# Patient Record
Sex: FEMALE | Race: WHITE | NOT HISPANIC OR LATINO | URBAN - METROPOLITAN AREA
[De-identification: names, ages, dates, MRNs, and addresses within clinical notes are randomized per-mention and may not be internally consistent; named-entity substitution may affect disease eponyms.]

---

## 2020-01-02 ENCOUNTER — APPOINTMENT (RX ONLY)
Dept: URBAN - METROPOLITAN AREA CLINIC 31 | Facility: CLINIC | Age: 80
Setting detail: DERMATOLOGY
End: 2020-01-02

## 2020-01-02 DIAGNOSIS — L30.8 OTHER SPECIFIED DERMATITIS: ICD-10-CM

## 2020-01-02 PROCEDURE — ? ADDITIONAL NOTES

## 2020-01-02 PROCEDURE — ? COUNSELING

## 2020-01-02 PROCEDURE — 99202 OFFICE O/P NEW SF 15 MIN: CPT

## 2020-01-02 ASSESSMENT — LOCATION SIMPLE DESCRIPTION DERM: LOCATION SIMPLE: LEFT HAND

## 2020-01-02 ASSESSMENT — LOCATION DETAILED DESCRIPTION DERM: LOCATION DETAILED: LEFT DORSAL MIDDLE FINGER METACARPOPHALANGEAL JOINT

## 2020-01-02 ASSESSMENT — LOCATION ZONE DERM: LOCATION ZONE: HAND

## 2020-01-02 NOTE — PROCEDURE: ADDITIONAL NOTES
Additional Notes: Dr. Sylvester examined pts hand and FA and checked elbow lymph nodes. No nodules noted today. Advised if site worsens and develops any symptoms or grows or changes will send back to Dr. Sims for a sterile biopsy. Pt agrees and understands.
Detail Level: Detailed

## 2020-01-02 NOTE — HPI: NODULE (SMALL BUMP UNDERNEATH SKIN)
Is This A New Presentation, Or A Follow-Up?: Nodule
How Severe Is Your Nodule?: mild
Additional History: Dr Sims recommended pt to see us. She saw him in November at time biopsies were taken and showed Granulomatous inflammation. Pt denies any gardening, fish tank exposure. She is in assisted living.

## 2021-12-06 ENCOUNTER — TELEPHONE (OUTPATIENT)
Dept: MEDICAL GROUP | Facility: MEDICAL CENTER | Age: 81
End: 2021-12-06

## 2021-12-06 NOTE — TELEPHONE ENCOUNTER
Trying to call patient for Medication review for [facility].     1 Attempt(s) made to contact patient.   Last voice mail left on 12/6/21      Awaiting patient call back.     Fadi Gonzalez, Pharm. D

## 2021-12-10 ENCOUNTER — TELEPHONE (OUTPATIENT)
Dept: HEALTH INFORMATION MANAGEMENT | Facility: OTHER | Age: 81
End: 2021-12-10

## 2021-12-15 NOTE — TELEPHONE ENCOUNTER
Trying to call patient for Medication review for [facility].     3rd and last Attempt made to contact patient.   Last voice mail left on 12/17/2021    Awaiting patient call back.     Fadi Gonzalez, Pharm. D          --------------------    Trying to call patient for Medication review for [facility].     2 Attempt(s) made to contact patient.   Last voice mail left on 12/15/21 @12:07pm    Awaiting patient call back.     Yarelis Ko, Pharm. D

## 2021-12-16 LAB
HBA1C MFR BLD: 6.3 % (ref 0–5.6)
INT CON NEG: ABNORMAL
INT CON POS: ABNORMAL

## 2021-12-21 PROBLEM — E78.5 HYPERLIPIDEMIA DUE TO TYPE 2 DIABETES MELLITUS (HCC): Status: ACTIVE | Noted: 2021-12-21

## 2021-12-21 PROBLEM — S88.119A AMPUTATION BELOW KNEE (HCC): Status: ACTIVE | Noted: 2021-12-21

## 2021-12-21 PROBLEM — G54.8 PHANTOM PAIN: Status: ACTIVE | Noted: 2021-12-21

## 2021-12-21 PROBLEM — R52 PHANTOM PAIN: Status: ACTIVE | Noted: 2021-12-21

## 2021-12-21 PROBLEM — E11.69 HYPERLIPIDEMIA DUE TO TYPE 2 DIABETES MELLITUS (HCC): Status: ACTIVE | Noted: 2021-12-21

## 2021-12-21 PROBLEM — G54.6 PHANTOM PAIN: Status: ACTIVE | Noted: 2021-12-21

## 2021-12-21 PROBLEM — E11.319 DIABETIC RETINOPATHY ASSOCIATED WITH TYPE 2 DIABETES MELLITUS (HCC): Status: ACTIVE | Noted: 2021-12-21

## 2021-12-21 PROBLEM — I69.959 HEMIPLEGIA OF DOMINANT SIDE AS LATE EFFECT FOLLOWING CEREBROVASCULAR DISEASE (HCC): Status: ACTIVE | Noted: 2021-12-21

## 2021-12-30 ENCOUNTER — TELEPHONE (OUTPATIENT)
Dept: SCHEDULING | Facility: IMAGING CENTER | Age: 81
End: 2021-12-30

## 2022-01-04 ENCOUNTER — HOSPITAL ENCOUNTER (OUTPATIENT)
Facility: MEDICAL CENTER | Age: 82
End: 2022-01-04
Attending: PHYSICIAN ASSISTANT
Payer: MEDICARE

## 2022-01-04 ENCOUNTER — OFFICE VISIT (OUTPATIENT)
Dept: MEDICAL GROUP | Facility: PHYSICIAN GROUP | Age: 82
End: 2022-01-04
Payer: MEDICARE

## 2022-01-04 VITALS
HEIGHT: 67 IN | OXYGEN SATURATION: 97 % | RESPIRATION RATE: 16 BRPM | WEIGHT: 160 LBS | BODY MASS INDEX: 25.11 KG/M2 | TEMPERATURE: 97 F | SYSTOLIC BLOOD PRESSURE: 138 MMHG | DIASTOLIC BLOOD PRESSURE: 60 MMHG | HEART RATE: 63 BPM

## 2022-01-04 DIAGNOSIS — K21.9 GASTROESOPHAGEAL REFLUX DISEASE, UNSPECIFIED WHETHER ESOPHAGITIS PRESENT: ICD-10-CM

## 2022-01-04 DIAGNOSIS — E78.5 DYSLIPIDEMIA, GOAL LDL BELOW 130: Chronic | ICD-10-CM

## 2022-01-04 DIAGNOSIS — E11.69 HYPERLIPIDEMIA DUE TO TYPE 2 DIABETES MELLITUS (HCC): ICD-10-CM

## 2022-01-04 DIAGNOSIS — S88.119A AMPUTATION BELOW KNEE (HCC): ICD-10-CM

## 2022-01-04 DIAGNOSIS — R15.9 BOWEL AND BLADDER INCONTINENCE: ICD-10-CM

## 2022-01-04 DIAGNOSIS — E78.5 HYPERLIPIDEMIA DUE TO TYPE 2 DIABETES MELLITUS (HCC): ICD-10-CM

## 2022-01-04 DIAGNOSIS — G54.6 PHANTOM PAIN: ICD-10-CM

## 2022-01-04 DIAGNOSIS — E11.319 DIABETIC RETINOPATHY OF BOTH EYES ASSOCIATED WITH TYPE 2 DIABETES MELLITUS, MACULAR EDEMA PRESENCE UNSPECIFIED, UNSPECIFIED RETINOPATHY SEVERITY (HCC): ICD-10-CM

## 2022-01-04 DIAGNOSIS — I69.959 HEMIPLEGIA OF DOMINANT SIDE AS LATE EFFECT FOLLOWING CEREBROVASCULAR DISEASE (HCC): ICD-10-CM

## 2022-01-04 DIAGNOSIS — R32 BOWEL AND BLADDER INCONTINENCE: ICD-10-CM

## 2022-01-04 PROBLEM — I69.359 HEMIPLEGIA AS LATE EFFECT OF CEREBROVASCULAR ACCIDENT (CVA) (HCC): Status: ACTIVE | Noted: 2021-01-07

## 2022-01-04 LAB
ALBUMIN SERPL BCP-MCNC: 4.3 G/DL (ref 3.2–4.9)
ALBUMIN/GLOB SERPL: 1.5 G/DL
ALP SERPL-CCNC: 53 U/L (ref 30–99)
ALT SERPL-CCNC: 12 U/L (ref 2–50)
ANION GAP SERPL CALC-SCNC: 10 MMOL/L (ref 7–16)
AST SERPL-CCNC: 15 U/L (ref 12–45)
BASOPHILS # BLD AUTO: 0.7 % (ref 0–1.8)
BASOPHILS # BLD: 0.05 K/UL (ref 0–0.12)
BILIRUB SERPL-MCNC: 0.5 MG/DL (ref 0.1–1.5)
BUN SERPL-MCNC: 25 MG/DL (ref 8–22)
CALCIUM SERPL-MCNC: 9.7 MG/DL (ref 8.5–10.5)
CHLORIDE SERPL-SCNC: 105 MMOL/L (ref 96–112)
CHOLEST SERPL-MCNC: 138 MG/DL (ref 100–199)
CO2 SERPL-SCNC: 25 MMOL/L (ref 20–33)
CREAT SERPL-MCNC: 0.69 MG/DL (ref 0.5–1.4)
EOSINOPHIL # BLD AUTO: 0.16 K/UL (ref 0–0.51)
EOSINOPHIL NFR BLD: 2.1 % (ref 0–6.9)
ERYTHROCYTE [DISTWIDTH] IN BLOOD BY AUTOMATED COUNT: 46.5 FL (ref 35.9–50)
FASTING STATUS PATIENT QL REPORTED: NORMAL
GLOBULIN SER CALC-MCNC: 2.8 G/DL (ref 1.9–3.5)
GLUCOSE SERPL-MCNC: 142 MG/DL (ref 65–99)
HCT VFR BLD AUTO: 50.6 % (ref 37–47)
HDLC SERPL-MCNC: 41 MG/DL
HGB BLD-MCNC: 16.3 G/DL (ref 12–16)
IMM GRANULOCYTES # BLD AUTO: 0.03 K/UL (ref 0–0.11)
IMM GRANULOCYTES NFR BLD AUTO: 0.4 % (ref 0–0.9)
LDLC SERPL CALC-MCNC: 77 MG/DL
LYMPHOCYTES # BLD AUTO: 1.54 K/UL (ref 1–4.8)
LYMPHOCYTES NFR BLD: 20.6 % (ref 22–41)
MCH RBC QN AUTO: 29.7 PG (ref 27–33)
MCHC RBC AUTO-ENTMCNC: 32.2 G/DL (ref 33.6–35)
MCV RBC AUTO: 92.2 FL (ref 81.4–97.8)
MONOCYTES # BLD AUTO: 0.52 K/UL (ref 0–0.85)
MONOCYTES NFR BLD AUTO: 7 % (ref 0–13.4)
NEUTROPHILS # BLD AUTO: 5.16 K/UL (ref 2–7.15)
NEUTROPHILS NFR BLD: 69.2 % (ref 44–72)
NRBC # BLD AUTO: 0 K/UL
NRBC BLD-RTO: 0 /100 WBC
PLATELET # BLD AUTO: 187 K/UL (ref 164–446)
PMV BLD AUTO: 12.2 FL (ref 9–12.9)
POTASSIUM SERPL-SCNC: 4.4 MMOL/L (ref 3.6–5.5)
PROT SERPL-MCNC: 7.1 G/DL (ref 6–8.2)
RBC # BLD AUTO: 5.49 M/UL (ref 4.2–5.4)
SODIUM SERPL-SCNC: 140 MMOL/L (ref 135–145)
TRIGL SERPL-MCNC: 101 MG/DL (ref 0–149)
WBC # BLD AUTO: 7.5 K/UL (ref 4.8–10.8)

## 2022-01-04 PROCEDURE — 85025 COMPLETE CBC W/AUTO DIFF WBC: CPT

## 2022-01-04 PROCEDURE — 80061 LIPID PANEL: CPT

## 2022-01-04 PROCEDURE — 99204 OFFICE O/P NEW MOD 45 MIN: CPT | Performed by: PHYSICIAN ASSISTANT

## 2022-01-04 PROCEDURE — 82570 ASSAY OF URINE CREATININE: CPT

## 2022-01-04 PROCEDURE — 82043 UR ALBUMIN QUANTITATIVE: CPT

## 2022-01-04 PROCEDURE — 36415 COLL VENOUS BLD VENIPUNCTURE: CPT

## 2022-01-04 PROCEDURE — 80053 COMPREHEN METABOLIC PANEL: CPT

## 2022-01-04 RX ORDER — UBIDECARENONE 200 MG
1 CAPSULE ORAL DAILY
COMMUNITY

## 2022-01-04 RX ORDER — BISACODYL 5 MG/1
TABLET, DELAYED RELEASE ORAL
COMMUNITY
End: 2022-04-26

## 2022-01-04 RX ORDER — BRIMONIDINE TARTRATE 2 MG/ML
SOLUTION/ DROPS OPHTHALMIC
COMMUNITY
Start: 2021-12-15 | End: 2022-01-25

## 2022-01-04 RX ORDER — GABAPENTIN 300 MG/1
CAPSULE ORAL
COMMUNITY
Start: 2021-12-16 | End: 2022-01-04

## 2022-01-04 RX ORDER — NAPROXEN SODIUM 220 MG
TABLET ORAL
COMMUNITY
End: 2022-01-25

## 2022-01-04 RX ORDER — MECLIZINE HYDROCHLORIDE 25 MG/1
25 TABLET ORAL
COMMUNITY
Start: 2021-08-03

## 2022-01-04 RX ORDER — ATORVASTATIN CALCIUM 10 MG/1
TABLET, FILM COATED ORAL
COMMUNITY
Start: 2021-11-24 | End: 2022-02-15 | Stop reason: SDUPTHER

## 2022-01-04 RX ORDER — GABAPENTIN 600 MG/1
600 TABLET ORAL 3 TIMES DAILY
Qty: 90 TABLET | Refills: 0 | Status: SHIPPED | OUTPATIENT
Start: 2022-01-04 | End: 2022-02-03

## 2022-01-04 RX ORDER — ACETAMINOPHEN 500 MG
TABLET ORAL
COMMUNITY

## 2022-01-04 RX ORDER — SITAGLIPTIN 50 MG/1
TABLET, FILM COATED ORAL
COMMUNITY
Start: 2021-12-15 | End: 2022-01-25

## 2022-01-04 RX ORDER — OMEPRAZOLE/SODIUM BICARBONATE 20MG-1.1G
1 CAPSULE ORAL
COMMUNITY
End: 2022-01-25

## 2022-01-04 RX ORDER — BIOTIN 10 MG
1 TABLET ORAL
COMMUNITY

## 2022-01-04 RX ORDER — CYANOCOBALAMIN (VITAMIN B-12) 500 MCG
TABLET ORAL
COMMUNITY

## 2022-01-04 RX ORDER — LATANOPROST 50 UG/ML
SOLUTION/ DROPS OPHTHALMIC
COMMUNITY
End: 2022-01-25

## 2022-01-04 RX ORDER — GABAPENTIN 300 MG/1
CAPSULE ORAL
COMMUNITY
Start: 2021-12-05 | End: 2022-01-04

## 2022-01-04 RX ORDER — LOPERAMIDE HYDROCHLORIDE 2 MG/1
TABLET ORAL
COMMUNITY
End: 2022-01-25

## 2022-01-04 RX ORDER — ASPIRIN 81 MG/1
TABLET, CHEWABLE ORAL
COMMUNITY

## 2022-01-04 ASSESSMENT — PATIENT HEALTH QUESTIONNAIRE - PHQ9: CLINICAL INTERPRETATION OF PHQ2 SCORE: 0

## 2022-01-04 NOTE — PROGRESS NOTES
CC:    Chief Complaint   Patient presents with   • Establish Care       HISTORY OF THE PRESENT ILLNESS: Patient is a 81 y.o. female presenting to establish primary care. She is resident at [facility].     1. Pt taking baby ASA. Has hx of stroke on May 14, 1973. States that it has affected her muscles in her bowel and bladder. Also has R sided hemiplegia.  2. Pt on lipitor for HLD.   3. Pt on gabapentin for phantom pains for amputations below the knee for both legs. It is not helping very much. Her old Rx indicates taking 900mg qhs. Has hx of osteomyelitis that required amputation. She is still followed by Dr Kraus.   4. Pt is diabetic, currently on Januvia. Januvia is very expensive for her. Had A1C run about 2 weeks ago at 6.3%. interested in trying something less expensive.  5. 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  6. Pt sees eye Dr Haq at [facility] beds at the [facility]    No problem-specific Assessment & Plan notes found for this encounter.    Allergies: Codeine, Pcn [penicillins], Sulfa drugs, Azithromycin, Gluten meal, Lactose, and Piperacillin sod-tazobactam so    Current Outpatient Medications Ordered in Epic   Medication Sig Dispense Refill   • acetaminophen (TYLENOL) 500 MG Tab 1 tablet as needed Orally every 6 hrs     • aspirin (ASA) 81 MG Chew Tab chewable tablet 2 tab(s) orally once a day     • atorvastatin (LIPITOR) 10 MG Tab      • Biotin 10 MG Tab Take 1 Tablet by mouth every day.     • bisacodyl (DULCOLAX) 5 MG EC tablet 2 tabs as needed Orally Once a day for 30 day(s)     • Coenzyme Q10 200 MG Cap Take 1 Capsule by mouth every day.     • gabapentin (NEURONTIN) 300 MG Cap 3  cap(s) orally daily at bedtime for 90 days     • gabapentin (NEURONTIN) 300 MG Cap      • meclizine (ANTIVERT) 25 MG Tab Take 25 mg by mouth.     • Omeprazole-Sodium Bicarbonate  MG Cap Take 1 Capsule by mouth every day.     •  JANUVIA 50 MG Tab      • Cyanocobalamin (B-12) 500 MCG Tab Take  by mouth.     • Docusate Calcium (STOOL SOFTENER PO) Take  by mouth.     • Calcium Carbonate-Vitamin D (CALCIUM 600+D) 600-200 MG-UNIT TABS Take  by mouth.     • brimonidine (ALPHAGAN) 0.2 % Solution  (Patient not taking: Reported on 2022)     • latanoprost (XALATAN) 0.005 % Solution 1 gtt in each eye once a day (at bedtime) (Patient not taking: Reported on 2022)     • loperamide (IMODIUM A-D) 2 MG tablet 1 tablet Orally prn for 30 day(s) (Patient not taking: Reported on 2022)     • naproxen (ANAPROX) 220 MG tablet 1 tablet as needed Orally every 12 hrs (Patient not taking: Reported on 2022)     • multivitamin (THERAGRAN) TABS Take 1 Tab by mouth every day. (Patient not taking: Reported on 2022)     • Teriparatide, Recombinant, (FORTEO SC) Inject  as instructed. (Patient not taking: Reported on 2022)       No current [facility]-ordered facility-administered medications on file.       Past Medical History:   Diagnosis Date   • CVA (cerebral vascular accident) ([facility])    • Neuropathic peripheral nerve    • OSTEOPOROSIS    • Right knee injury     meniscus damage   • S/P BKA (below knee amputation) ([facility])        Past Surgical History:   Procedure Laterality Date   • ABDOMINAL HYSTERECTOMY TOTAL      bilateral salpingo-oophorectomy in  and    • HIP ARTHROPLASTY MIS TOTAL     • KNEE AMPUTATION BELOW Bilateral        Social History     Tobacco Use   • Smoking status: Former Smoker     Quit date: 1988     Years since quittin.0   • Smokeless tobacco: Never Used   Vaping Use   • Vaping Use: Never used   Substance Use Topics   • Alcohol use: No   • Drug use: Not on file       Social History     Social History Narrative   • Not on file       Family History   Problem Relation Age of Onset   • Heart Failure Mother    • Heart Disease Father        ROS:     - Constitutional: Negative for fever, chills, unexpected weight change, and  "fatigue/generalized weakness.     - HEENT: Negative for headaches, vision changes, hearing changes, ear pain, ear discharge, rhinorrhea, sinus congestion, sore throat, and neck pain.      - Respiratory: Negative for cough, sputum production, chest congestion, dyspnea, wheezing, and crackles.      - Cardiovascular: Negative for chest pain, palpitations, orthopnea, and bilateral lower extremity edema.     - Gastrointestinal:Positive for bowel incontinence. Negative for heartburn, nausea, vomiting, abdominal pain, hematochezia, melena, diarrhea, constipation, and greasy/foul-smelling stools.     - Genitourinary:Positive for urinary incontinence.  Negative for dysuria, polyuria, hematuria, pyuria, urinary urgency,     - Musculoskeletal: Negative for myalgias, back pain, and joint pain.     - Skin: Negative for rash, itching, cyanotic skin color change.     - Neurological:Positive for phantom pain. Negative for dizziness, tingling, tremors, focal sensory deficit, focal weakness and headaches.     - Endo/Heme/Allergies: Does not bruise/bleed easily.     - Psychiatric/Behavioral: Negative for depression, suicidal/homicidal ideation and memory loss.            .      Exam: /60   Pulse 63   Temp 36.1 °C (97 °F) (Temporal)   Resp 16   Ht 1.702 m (5' 7\")   Wt 72.6 kg (160 lb)   SpO2 97%  Body mass index is 25.06 kg/m².    General: Normal appearing. Wheelchair bound  Skin: Warm and dry.  No obvious lesions.  HEENT: Normocephalic. Eyes conjunctiva clear lids without ptosis, ears normal shape and contour  Cardiovascular: Regular rate and rhythm without murmur.   Respiratory: Clear to auscultation bilaterally, no rhonchi wheezing or rales.  Neurologic:  A&O x3, gross weakness of R side, wheelchair bound  Musculoskeletal: Positive for bilateral below knee amputations, Positive for L 3rd finger amputation.  Psych: Normal mood and affect. Judgment and insight is normal.    Please note that this dictation was created using " voice recognition software. I have made every reasonable attempt to correct obvious errors, but I expect that there are errors of grammar and possibly content that I did not discover before finalizing the note.      Assessment/Plan  1. Gastroesophageal reflux disease, unspecified whether esophagitis present  Continue omeprazole.    2. Phantom pain (HCC)  Poorly controlled. Will increase gabapentin to 600mg TID and recheck in 3 weeks.   - gabapentin (NEURONTIN) 600 MG tablet; Take 1 Tablet by mouth 3 times a day for 30 days.  Dispense: 90 Tablet; Refill: 0    3. Amputation below knee (HCC)  Wheelchair bound    4. Dyslipidemia, goal LDL below 130  Will get updated labs and review at next visit  - Lipid Profile; Future    5. 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 At next visit will discontinue januvia and trial on metformin XR to see if she can tolerate better.   - CBC WITH DIFFERENTIAL; Future  - Comp Metabolic Panel; Future  - MICROALBUMIN CREAT RATIO URINE; Future    6. Hemiplegia of dominant side as late effect following cerebrovascular disease (HCC)  Stable.     7. Hyperlipidemia due to type 2 diabetes mellitus (HCC)  Continue with lipitor.     8. Bowel and bladder incontinence  Managing well on her own with this.

## 2022-01-05 DIAGNOSIS — E11.319 DIABETIC RETINOPATHY OF BOTH EYES ASSOCIATED WITH TYPE 2 DIABETES MELLITUS, MACULAR EDEMA PRESENCE UNSPECIFIED, UNSPECIFIED RETINOPATHY SEVERITY (HCC): ICD-10-CM

## 2022-01-05 LAB
CREAT UR-MCNC: 50.48 MG/DL
MICROALBUMIN UR-MCNC: 1.3 MG/DL
MICROALBUMIN/CREAT UR: 26 MG/G (ref 0–30)

## 2022-01-25 ENCOUNTER — OFFICE VISIT (OUTPATIENT)
Dept: MEDICAL GROUP | Facility: PHYSICIAN GROUP | Age: 82
End: 2022-01-25
Payer: MEDICARE

## 2022-01-25 VITALS
TEMPERATURE: 97 F | BODY MASS INDEX: 25.06 KG/M2 | RESPIRATION RATE: 16 BRPM | HEART RATE: 67 BPM | SYSTOLIC BLOOD PRESSURE: 118 MMHG | DIASTOLIC BLOOD PRESSURE: 60 MMHG | HEIGHT: 67 IN

## 2022-01-25 DIAGNOSIS — M81.0 AGE-RELATED OSTEOPOROSIS WITHOUT CURRENT PATHOLOGICAL FRACTURE: ICD-10-CM

## 2022-01-25 DIAGNOSIS — E11.69 TYPE 2 DIABETES MELLITUS WITH OTHER SPECIFIED COMPLICATION, WITHOUT LONG-TERM CURRENT USE OF INSULIN (HCC): ICD-10-CM

## 2022-01-25 PROBLEM — Z89.511 ACQUIRED ABSENCE OF RIGHT LOWER EXTREMITY BELOW KNEE (HCC): Status: ACTIVE | Noted: 2022-01-25

## 2022-01-25 PROBLEM — Z89.512 ACQUIRED ABSENCE OF LEFT LEG BELOW KNEE (HCC): Status: ACTIVE | Noted: 2022-01-25

## 2022-01-25 PROCEDURE — 99214 OFFICE O/P EST MOD 30 MIN: CPT | Performed by: PHYSICIAN ASSISTANT

## 2022-01-25 RX ORDER — METFORMIN HYDROCHLORIDE 500 MG/1
500 TABLET, EXTENDED RELEASE ORAL 2 TIMES DAILY
Qty: 60 TABLET | Refills: 0 | Status: SHIPPED | OUTPATIENT
Start: 2022-01-25 | End: 2022-02-16

## 2022-01-25 NOTE — PROGRESS NOTES
CC:  Chief Complaint   Patient presents with   • Lab Results       HISTORY OF PRESENT ILLNESS: Patient is a 81 y.o. female established patient presenting for recheck and lab results.   1. Pt's lipids well controlled.   2. Today we are going to trial her on metformin XR and see if she tolerates it. Her last Januvia Rx cost her over $300.   3. Pt went to the [facility] to have her prolia shot for her osteoporosis but was denied because her Ca was low at 8.4. Pt notes that the order for the prolia was under her old provider's name, not mine.     No problem-specific Assessment & Plan notes found for this encounter.      Patient Active Problem List    Diagnosis Date Noted   • Gastroesophageal reflux disease 01/04/2022   • Bowel and bladder incontinence 01/04/2022   • BMI 25.0-25.9,adult 12/21/2021   • Amputation below knee ([facility]) 12/21/2021   • Phantom pain ([facility]) 12/21/2021   • Hyperlipidemia due to type 2 diabetes mellitus ([facility]) 12/21/2021   • Hemiplegia as late effect of cerebrovascular accident (CVA) ([facility]) 01/07/2021   • Diabetic retinopathy of both eyes associated with type 2 diabetes mellitus ([facility]) 01/07/2021   • Hyperlipidemia 10/25/2012   • Age-related osteoporosis without current pathological fracture 10/25/2012      Allergies:Codeine, Pcn [penicillins], Sulfa drugs, Azithromycin, Gluten meal, Lactose, and Piperacillin sod-tazobactam so    Current Outpatient Medications   Medication Sig Dispense Refill   • acetaminophen (TYLENOL) 500 MG Tab 1 tablet as needed Orally every 6 hrs     • aspirin (ASA) 81 MG Chew Tab chewable tablet 2 tab(s) orally once a day     • atorvastatin (LIPITOR) 10 MG Tab      • Biotin 10 MG Tab Take 1 Tablet by mouth every day.     • bisacodyl (DULCOLAX) 5 MG EC tablet 2 tabs as needed Orally Once a day for 30 day(s)     • Coenzyme Q10 200 MG Cap Take 1 Capsule by mouth every day.     • meclizine (ANTIVERT) 25 MG Tab Take 25 mg by mouth.     • JANUVIA 50 MG Tab      •  "Cyanocobalamin (B-12) 500 MCG Tab Take  by mouth.     • gabapentin (NEURONTIN) 600 MG tablet Take 1 Tablet by mouth 3 times a day for 30 days. 90 Tablet 0   • Docusate Calcium (STOOL SOFTENER PO) Take  by mouth.     • Calcium Carbonate-Vitamin D (CALCIUM 600+D) 600-200 MG-UNIT TABS Take  by mouth.       No current facility-administered medications for this visit.       Social History     Tobacco Use   • Smoking status: Former Smoker     Quit date: 1988     Years since quittin.0   • Smokeless tobacco: Never Used   Vaping Use   • Vaping Use: Never used   Substance Use Topics   • Alcohol use: No   • Drug use: Not on file     Social History     Social History Narrative   • Not on file       Family History   Problem Relation Age of Onset   • Heart Failure Mother    • Heart Disease Father         ROS:     - Constitutional:  Negative for fever, chills, unexpected weight change, and fatigue/generalized weakness.    - HEENT:  Negative for headaches, vision changes, hearing changes, ear pain, ear discharge, rhinorrhea, sinus congestion, sore throat, and neck pain.        - Neurological:Positive for phantom pains. Negative for dizziness, tingling, tremors, focal sensory deficit, focal weakness and headaches.       Exam:    /60   Pulse 67   Temp 36.1 °C (97 °F) (Temporal)   Resp 16   Ht 1.702 m (5' 7\")  Body mass index is 25.06 kg/m².    General:  Well nourished, well developed female in NAD  Head is grossly normal.  Neck: Supple. Thyroid is not enlarged.  Pulmonary: Clear to auscultation. No ronchi, wheezing or rales  Cardiac: Regular rate and rhythm. No murmurs.  Skin: Warm and dry. No obvious lesions  Neuro: Normal muscle tone. Wheelchair bound 2/2 amputations of bilateral LEs. Alert and oriented.  Psych: Normal mood and affect      Please note that this dictation was created using voice recognition software. I have made every reasonable attempt to correct obvious errors, but I expect that there are errors " of grammar and possibly content that I did not discover before finalizing the note.    LABS: 1/25/2022: Results reviewed and discussed with the patient, questions answered.    Assessment/Plan:  1. Age-related osteoporosis without current pathological fracture  Will check Ca levels again and find out where she can get her Prolia injection done.   - Basic Metabolic Panel; Future  - VITAMIN D,25 HYDROXY; Future    2. Type 2 diabetes mellitus with other specified complication, without long-term current use of insulin (HCC)  Stop januvia and start on metformin XR. Will see her again for this in 3 weeks to reassess.   - metFORMIN ER (GLUCOPHAGE XR) 500 MG TABLET SR 24 HR; Take 1 Tablet by mouth 2 times a day for 30 days.  Dispense: 60 Tablet; Refill: 0

## 2022-01-31 ENCOUNTER — TELEPHONE (OUTPATIENT)
Dept: MEDICAL GROUP | Facility: PHYSICIAN GROUP | Age: 82
End: 2022-01-31

## 2022-01-31 NOTE — TELEPHONE ENCOUNTER
Pt called stating that she was switched to the Metformin and started taking it Tuesday, and had severe diarrhea by Wednesday morning. She discontinued the medication and started taking her Januvia again but she would like to know if there is an alternative to the metformin that she can take or a way we can get the cost of the Januvia down.

## 2022-02-15 ENCOUNTER — APPOINTMENT (OUTPATIENT)
Dept: MEDICAL GROUP | Facility: PHYSICIAN GROUP | Age: 82
End: 2022-02-15
Payer: MEDICARE

## 2022-02-15 ENCOUNTER — TELEPHONE (OUTPATIENT)
Dept: VASCULAR LAB | Facility: MEDICAL CENTER | Age: 82
End: 2022-02-15

## 2022-02-15 ENCOUNTER — OFFICE VISIT (OUTPATIENT)
Dept: MEDICAL GROUP | Facility: PHYSICIAN GROUP | Age: 82
End: 2022-02-15
Payer: MEDICARE

## 2022-02-15 VITALS
BODY MASS INDEX: 25.11 KG/M2 | HEIGHT: 67 IN | RESPIRATION RATE: 16 BRPM | WEIGHT: 160 LBS | HEART RATE: 72 BPM | OXYGEN SATURATION: 95 % | SYSTOLIC BLOOD PRESSURE: 122 MMHG | DIASTOLIC BLOOD PRESSURE: 64 MMHG | TEMPERATURE: 97.1 F

## 2022-02-15 DIAGNOSIS — E11.69 HYPERLIPIDEMIA DUE TO TYPE 2 DIABETES MELLITUS (HCC): ICD-10-CM

## 2022-02-15 DIAGNOSIS — E11.69 TYPE 2 DIABETES MELLITUS WITH OTHER SPECIFIED COMPLICATION, WITHOUT LONG-TERM CURRENT USE OF INSULIN (HCC): ICD-10-CM

## 2022-02-15 DIAGNOSIS — G54.6 PHANTOM PAIN: ICD-10-CM

## 2022-02-15 DIAGNOSIS — Z78.0 POSTMENOPAUSAL STATUS (AGE-RELATED) (NATURAL): ICD-10-CM

## 2022-02-15 DIAGNOSIS — N95.1 MENOPAUSAL STATE: ICD-10-CM

## 2022-02-15 DIAGNOSIS — K21.9 GASTROESOPHAGEAL REFLUX DISEASE, UNSPECIFIED WHETHER ESOPHAGITIS PRESENT: ICD-10-CM

## 2022-02-15 DIAGNOSIS — E11.319 DIABETIC RETINOPATHY OF BOTH EYES ASSOCIATED WITH TYPE 2 DIABETES MELLITUS, MACULAR EDEMA PRESENCE UNSPECIFIED, UNSPECIFIED RETINOPATHY SEVERITY (HCC): ICD-10-CM

## 2022-02-15 DIAGNOSIS — E78.5 HYPERLIPIDEMIA DUE TO TYPE 2 DIABETES MELLITUS (HCC): ICD-10-CM

## 2022-02-15 DIAGNOSIS — M81.0 AGE-RELATED OSTEOPOROSIS WITHOUT CURRENT PATHOLOGICAL FRACTURE: ICD-10-CM

## 2022-02-15 DIAGNOSIS — Z12.31 ENCOUNTER FOR SCREENING MAMMOGRAM FOR BREAST CANCER: ICD-10-CM

## 2022-02-15 PROCEDURE — 99213 OFFICE O/P EST LOW 20 MIN: CPT | Performed by: NURSE PRACTITIONER

## 2022-02-15 RX ORDER — IBUPROFEN 200 MG
950 CAPSULE ORAL
COMMUNITY
Start: 2022-01-30 | End: 2022-08-04

## 2022-02-15 RX ORDER — OMEPRAZOLE 20 MG/1
20 CAPSULE, DELAYED RELEASE ORAL DAILY
COMMUNITY

## 2022-02-15 RX ORDER — ATORVASTATIN CALCIUM 10 MG/1
10 TABLET, FILM COATED ORAL DAILY
Qty: 90 TABLET | Refills: 2 | Status: SHIPPED | OUTPATIENT
Start: 2022-02-15 | End: 2022-05-23 | Stop reason: SDUPTHER

## 2022-02-15 ASSESSMENT — FIBROSIS 4 INDEX: FIB4 SCORE: 1.88

## 2022-02-15 NOTE — ASSESSMENT & PLAN NOTE
Bilateral BKA  States not caused by diabetes. States caused by osteomylitis  Currently on gabapentin for pain

## 2022-02-15 NOTE — ASSESSMENT & PLAN NOTE
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 Haq at [facility] every 3 months

## 2022-02-15 NOTE — ASSESSMENT & PLAN NOTE
Chronic and stable condition   New labs were ordered by last provider on 1/25/2022 for calcium- has not completed yet  Gets prolia injections tried to get one recently but states calcium was too low 8.4 for them to do prolia   Needs new dexa scan as she has not had follow up since being on Prolia

## 2022-02-15 NOTE — ASSESSMENT & PLAN NOTE
Chronic and stable condition   Was being prescribed Junius however due to cost she is unable to afford. Pt was recently started on metformin, which she had been on before and states it is causing same issus it caused when she was on it last time with the diarrhea  States she can only make appt for Tuesday, Thursday  Last A1C 6.3 in 12/2021

## 2022-02-15 NOTE — ASSESSMENT & PLAN NOTE
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

## 2022-02-16 PROBLEM — S68.119A FINGERTIP AMPUTATION: Status: ACTIVE | Noted: 2022-02-16

## 2022-02-16 ASSESSMENT — ENCOUNTER SYMPTOMS
HEADACHES: 0
COUGH: 0
DIZZINESS: 0
PSYCHIATRIC NEGATIVE: 1
PALPITATIONS: 0
WEAKNESS: 1
BLURRED VISION: 1
WEIGHT LOSS: 0
CHILLS: 0
FEVER: 0
MYALGIAS: 0
SHORTNESS OF BREATH: 0

## 2022-02-16 NOTE — TELEPHONE ENCOUNTER
Left vM message to schedule DM visit with the pharmacist for drug costs  Mary Cisneros, Clinical Pharmacist, CDE, CACP

## 2022-02-17 NOTE — PROGRESS NOTES
Chief Complaint   Patient presents with   • Establish Care      Subjective:     Shireen Astorga is a 82 y.o. female presenting to establish care    Type 2 diabetes mellitus with other specified complication (HCC)  Chronic and stable condition   Was being prescribed Junius however due to cost she is unable to afford. Pt was recently started on metformin, which she had been on before and states it is causing same issus it caused when she was on it last time with the diarrhea  States she can only make appt for Tuesday, Thursday  Last A1C 6.3 in 12/2021    Phantom pain (HCC)  Bilateral BKA  States not caused by diabetes. States caused by osteomylitis  Currently on gabapentin for pain    Hyperlipidemia due to type 2 diabetes mellitus (HCC)  Chronic and stable condition   Takes atorvastatin 10 mg daily   Denies myalgias    Hemiplegia as late effect of cerebrovascular accident (CVA) ([facility])  chronic and stale condition  CVA 5/14/1973  RA weakness, unable to use RA  Currently on atorvastatin, ASA 81 mg  Uses WC for mobility   Able to stand for short period of time and swivle    Gastroesophageal reflux disease  Chronic and stabble condition   Currently controlled with omeprazole    Diabetic retinopathy of both eyes associated with type 2 diabetes mellitus (HCC)  Chronic and stable condition   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  Right BKA in 2010 due to osteomyelitis patient complains of phantom pain at times  No current osteomyelitis   wears prosthetics      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 (HCC)  Chronic and stable condition  Left BKA in 2007 due to osteomyelitis  Patient complains of phantom pain at times in the left lower extremity  Denies any current issues with osteomyelitis  Wears prosthetics    Fingertip amputation  Chronic and stable condition  Left finger 3rd digit  States cream placed on wound ate through bone many years ago       Review of Systems   Constitutional: Negative for chills, fever, malaise/fatigue and weight loss.   HENT: Negative.    Eyes: Positive for blurred vision.   Respiratory: Negative for cough and shortness of breath.    Cardiovascular: Negative for chest pain, palpitations and leg swelling.   Genitourinary: Negative.    Musculoskeletal: Negative for myalgias.        Phantom leg pain   Skin: Negative.    Neurological: Positive for weakness. Negative for dizziness and headaches.   Psychiatric/Behavioral: Negative.             Current Outpatient Medications:   •  calcium citrate (CALCITRATE) 950 (200 Ca) MG Tab, Take 950 mg by mouth., Disp: , Rfl:   •  vitamin D (VITAMIND D3) 1000 UNIT Tab, Take 1,000 Units by mouth every day., Disp: , Rfl:   •  omeprazole (PRILOSEC) 20 MG delayed-release capsule, Take 20 mg by mouth every day., Disp: , Rfl:   •  BRIMONIDINE TARTRATE OP, Administer  into affected eye(s)., Disp: , Rfl:   •  atorvastatin (LIPITOR) 10 MG Tab, Take 1 Tablet by mouth every day., Disp: 90 Tablet, Rfl: 2  •  acetaminophen (TYLENOL) 500 MG Tab, 1 tablet as needed Orally every 6 hrs, Disp: , Rfl:   •  aspirin (ASA) 81 MG Chew Tab chewable tablet, 2 tab(s) orally once a day, Disp: , Rfl:   •  Biotin 10 MG Tab, Take 1 Tablet by mouth every day., Disp: , Rfl:   •  bisacodyl (DULCOLAX) 5 MG EC tablet, 2 tabs as needed Orally Once a day for 30 day(s), Disp: ,  "Rfl:   •  Coenzyme Q10 200 MG Cap, Take 1 Capsule by mouth every day., Disp: , Rfl:   •  meclizine (ANTIVERT) 25 MG Tab, Take 25 mg by mouth., Disp: , Rfl:   •  Cyanocobalamin (B-12) 500 MCG Tab, Take  by mouth., Disp: , Rfl:   •  Calcium Carbonate-Vitamin D (CALCIUM 600+D) 600-200 MG-UNIT TABS, Take  by mouth., Disp: , Rfl:     Past Medical History:   Diagnosis Date   • CVA (cerebral vascular accident) ([facility])    • Neuropathic peripheral nerve    • OSTEOPOROSIS    • Right knee injury     meniscus damage   • S/P BKA (below knee amputation) ([facility])        Past Surgical History:   Procedure Laterality Date   • ABDOMINAL HYSTERECTOMY TOTAL      bilateral salpingo-oophorectomy in 89 and 89   • HIP ARTHROPLASTY MIS TOTAL     • KNEE AMPUTATION BELOW Bilateral        Family History   Problem Relation Age of Onset   • Heart Failure Mother    • Heart Disease Father        Codeine, Pcn [penicillins], Sulfa drugs, Azithromycin, Gluten meal, Lactose, and Piperacillin sod-tazobactam so    Allergies, past medical history, past surgical history, family history, social history reviewed and updated    Objective:     Vitals: /64   Pulse 72   Temp 36.2 °C (97.1 °F) (Temporal)   Resp 16   Ht 1.702 m (5' 7\")   Wt 72.6 kg (160 lb)   LMP 10/28/1989   SpO2 95%   BMI 25.06 kg/m²   General: Alert, pleasant, NAD  EYES:   PERRL, EOMI, no icterus or pallor.  Conjunctivae and lids normal.   HENT:  Normocephalic.  External ears normal. Tympanic membranes pearly, opaque.  No nasal drainage present.  Oropharynx non-erythematous, mucous membranes moist.  Neck supple.   No thyromegaly or masses palpated. No cervical or supraclavicular lymphadenopathy.  Heart: Regular rate and rhythm.  S1 and S2 normal.  No murmurs appreciated.  Respiratory: Normal respiratory effort.  Clear to auscultation bilaterally.  Abdomen: Non-distended, soft, non-tender, no guarding/rebound. Bowel sounds present.  No hepatosplenomegaly.  No hernias.  Skin: Warm, dry, " no rashes.  Musculoskeletal: uses WC for mobility,  Right sided hemplegia, contractured right hand with minimal ROM.  Moves other all extremities well.    Extremities: No clubbing, cyanosis or edema noted. Missing 3rd digit on left hand   Neurological: No tremors, sensation grossly intact, tone/strength normal 2/2 on left side, 1/2 on right side UE, CN2-12 intact.  Psych:  Affect/mood is normal, judgement is good, memory is intact, grooming is appropriate.    Assessment/Plan:     1. Hyperlipidemia due to type 2 diabetes mellitus (HCC)  - Referral to Pharmacotherapy Service  - atorvastatin (LIPITOR) 10 MG Tab; Take 1 Tablet by mouth every day.  Dispense: 90 Tablet; Refill: 2  2. Type 2 diabetes mellitus with other specified complication, without long-term current use of insulin (HCC)  Patient would benefit from follow up with diabetic pharmacist. Patient can only be seen in Tuesday ad Thursdays    3. Phantom pain (HCC)  Continue with gabapentin    4. Gastroesophageal reflux disease, unspecified whether esophagitis present  Continue with prilosec    5. Diabetic retinopathy of both eyes associated with type 2 diabetes mellitus, macular edema presence unspecified, unspecified retinopathy severity ([facility])  [facility]    6. Encounter for screening mammogram for breast cancer  - MA-SCREENING MAMMO BILAT W/TOMOSYNTHESIS W/CAD; Future  Wants referral for mammogram- discussed she is no longer needing to screen but would like to continue at this time    7. Age-related osteoporosis without current pathological fracture  - Basic Metabolic Panel; Future  Trend Ca and then follow up regarding prolia injections  Needs Dexa scan   8. Postmenopausal status (age-related) (natural)  - DS-BONE DENSITY STUDY (DEXA); Future  9. Menopausal state  - DS-BONE DENSITY STUDY (DEXA); Future       Discussed with patient possible alternative diagnoses, patient is to take all medications as prescribed.      If symptoms persist FU w/PCP, if symptoms worsen go to emergency room.     If experiencing any side effects from prescribed medications reports to the office immediately or go to emergency room.    Reviewed indication, dosage, usage and potential adverse effects of prescribed medications.     Reviewed risks and benefits of treatment plan. Patient verbalizes understanding of all instruction and verbally agrees to plan.    Discussed plan with the patient, and she agrees to the above.      I personally reviewed prior external notes and test results pertinent to today's visit.        Return in about 2 weeks (around 3/1/2022) for lab review.    My total time spent caring for the patient on the day of the encounter was 25 minutes.   This does not include time spent on separately billable procedures/tests.     Please note that this dictation was created using voice recognition software. I have made every reasonable attempt to correct obvious errors, but I expect that there may be errors of grammar and possibly content that I did not discover before finalizing the note.

## 2022-02-17 NOTE — ASSESSMENT & PLAN NOTE
Chronic and stable condition.  Right BKA in 2010 due to osteomyelitis patient complains of phantom pain at times  No current osteomyelitis   wears prosthetics

## 2022-02-17 NOTE — ASSESSMENT & PLAN NOTE
Chronic and stable condition  Left BKA in 2007 due to osteomyelitis  Patient complains of phantom pain at times in the left lower extremity  Denies any current issues with osteomyelitis  Wears prosthetics

## 2022-02-17 NOTE — ASSESSMENT & PLAN NOTE
Chronic and stable condition  Left finger 3rd digit  States cream placed on wound ate through bone many years ago

## 2022-02-24 ENCOUNTER — TELEPHONE (OUTPATIENT)
Dept: MEDICAL GROUP | Facility: PHYSICIAN GROUP | Age: 82
End: 2022-02-24
Payer: MEDICARE

## 2022-03-08 ENCOUNTER — TELEPHONE (OUTPATIENT)
Dept: MEDICAL GROUP | Facility: PHYSICIAN GROUP | Age: 82
End: 2022-03-08
Payer: MEDICARE

## 2022-03-08 NOTE — TELEPHONE ENCOUNTER
Pt called asking for Nystatin cream for rash, states somebody else prescribed it one year ago and will like a refill. I explained that she needs an appt for medication because current PCP has not prescribed it before, pt upset and wants to talk to provider. In addition pt states she has been calling provider for different reasons and hasn't gotten a call back

## 2022-03-10 ENCOUNTER — NON-PROVIDER VISIT (OUTPATIENT)
Dept: MEDICAL GROUP | Facility: PHYSICIAN GROUP | Age: 82
End: 2022-03-10
Payer: MEDICARE

## 2022-03-10 ENCOUNTER — ANTICOAGULATION MONITORING (OUTPATIENT)
Dept: MEDICAL GROUP | Facility: PHYSICIAN GROUP | Age: 82
End: 2022-03-10

## 2022-03-10 DIAGNOSIS — E11.65 TYPE 2 DIABETES MELLITUS WITH HYPERGLYCEMIA, WITHOUT LONG-TERM CURRENT USE OF INSULIN (HCC): ICD-10-CM

## 2022-03-10 PROCEDURE — 83036 HEMOGLOBIN GLYCOSYLATED A1C: CPT | Performed by: STUDENT IN AN ORGANIZED HEALTH CARE EDUCATION/TRAINING PROGRAM

## 2022-03-10 PROCEDURE — 99211 OFF/OP EST MAY X REQ PHY/QHP: CPT | Performed by: STUDENT IN AN ORGANIZED HEALTH CARE EDUCATION/TRAINING PROGRAM

## 2022-03-10 NOTE — NON-PROVIDER
Patient Consult Note    TIME IN: 10:00  TIME OUT: 10:33    Primary care physician: CARMELO Herbert    Reason for consult: Management of Uncontrolled Type 2 Diabetes    HPI:  Shireen Astorga is a 82 y.o. old patient who comes in today for evaluation of above stated problem.    Most Recent HbA1c:   Lab Results   Component Value Date/Time    HBA1C 6.3 (A) 12/16/2021 12:00 AM      Lab Results   Component Value Date/Time    CREATININE 0.69 01/04/2022 11:12 AM              Diabetes Medication History and Current Regimen  Metformin: not tolerated secondary to GI SE     Januvia      Pt has home glucometer and proper testing technique - yes    Pt reports blood sugars:       Hypoglycemia awareness - yes  Nocturnal hypoglycemia- none  Hypoglycemia:  None    Pt's treatment of Hypoglycemia - n/a  - 15:15 Rule    Current Exercise - none pt is wheelchair bound  Exercise Goal - pt would benefit from 30 minutes of daily dedicated walking, however is unable to due to her wheelchair status    Dietary - common adult        Foot Exam:  Monofilament exam - current  Monofilament testing with a 10 gram force: sensation intact: decreased bilaterally.    Visual Inspection: Feet without maceration, ulcers, fissures.  Feet dry.  Pedal pulses: intact bilaterally    Preventative Management  BP regimen (ACE/ARB) - none  ASA - 81m gpo daily  Statin - atorva 10mg po daiyl  Last Retinal Scan - over due  Last Foot Exam - current  Last A1c -   Lab Results   Component Value Date/Time    HBA1C 6.3 (A) 12/16/2021 12:00 AM      Last Microalbuminuria - current     updated caregaps    Past Medical History:  Patient Active Problem List    Diagnosis Date Noted   • Fingertip amputation 02/16/2022   • Type 2 diabetes mellitus with other specified complication (HCC) 01/25/2022   • 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) 01/25/2022   • 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 (HCC) 01/25/2022   • Gastroesophageal reflux disease  2022   • Bowel and bladder incontinence 2022   • BMI 25.0-25.9,adult 2021   • Phantom pain ([facility]) 2021   • Hyperlipidemia due to type 2 diabetes mellitus ([facility]) 2021   • Hemiplegia as late effect of cerebrovascular accident (CVA) ([facility]) 2021   • Diabetic retinopathy of both eyes associated with type 2 diabetes mellitus ([facility]) 2021   • Age-related osteoporosis without current pathological fracture 10/25/2012       Past Surgical History:  Past Surgical History:   Procedure Laterality Date   • ABDOMINAL HYSTERECTOMY TOTAL      bilateral salpingo-oophorectomy in  and 89   • HIP ARTHROPLASTY MIS TOTAL     • KNEE AMPUTATION BELOW Bilateral        Allergies:  Codeine, Pcn [penicillins], Sulfa drugs, Azithromycin, Gluten meal, Lactose, and Piperacillin sod-tazobactam so    Social History:  Social History     Socioeconomic History   • Marital status: Unknown     Spouse name: Not on file   • Number of children: Not on file   • Years of education: Not on file   • Highest education level: Not on file   Occupational History   • Not on file   Tobacco Use   • Smoking status: Former Smoker     Quit date: 1988     Years since quittin.2   • Smokeless tobacco: Never Used   Vaping Use   • Vaping Use: Never used   Substance and Sexual Activity   • Alcohol use: No   • Drug use: Not on file   • Sexual activity: Not on file   Other Topics Concern   • Not on file   Social History Narrative   • Not on file     Social Determinants of Health     Financial Resource Strain: Not on file   Food Insecurity: Not on file   Transportation Needs: Not on file   Physical Activity: Not on file   Stress: Not on file   Social Connections: Not on file   Intimate Partner Violence: Not on file   Housing Stability: Not on file       Family History:  Family History   Problem Relation Age of Onset   • Heart Failure Mother    • Heart Disease Father        Medications:    Current Outpatient Medications:   •  calcium  citrate (CALCITRATE) 950 (200 Ca) MG Tab, Take 950 mg by mouth., Disp: , Rfl:   •  vitamin D (VITAMIND D3) 1000 UNIT Tab, Take 1,000 Units by mouth every day., Disp: , Rfl:   •  omeprazole (PRILOSEC) 20 MG delayed-release capsule, Take 20 mg by mouth every day., Disp: , Rfl:   •  BRIMONIDINE TARTRATE OP, Administer  into affected eye(s)., Disp: , Rfl:   •  atorvastatin (LIPITOR) 10 MG Tab, Take 1 Tablet by mouth every day., Disp: 90 Tablet, Rfl: 2  •  aspirin (ASA) 81 MG Chew Tab chewable tablet, 2 tab(s) orally once a day, Disp: , Rfl:   •  Biotin 10 MG Tab, Take 1 Tablet by mouth every day., Disp: , Rfl:   •  bisacodyl (DULCOLAX) 5 MG EC tablet, 2 tabs as needed Orally Once a day for 30 day(s), Disp: , Rfl:   •  Coenzyme Q10 200 MG Cap, Take 1 Capsule by mouth every day., Disp: , Rfl:   •  meclizine (ANTIVERT) 25 MG Tab, Take 25 mg by mouth., Disp: , Rfl:   •  Cyanocobalamin (B-12) 500 MCG Tab, Take  by mouth., Disp: , Rfl:   •  Calcium Carbonate-Vitamin D 600-200 MG-UNIT Tab, Take  by mouth., Disp: , Rfl:   •  acetaminophen (TYLENOL) 500 MG Tab, 1 tablet as needed Orally every 6 hrs (Patient not taking: Reported on 3/10/2022), Disp: , Rfl:     Labs: Reviewed    Physical Examination:  Vital signs: LMP 10/28/1989  There is no height or weight on file to calculate BMI.    Assessment and Plan:    1. DM2  • Last A1c 6.3 from [facility]  • Pt is currently unable to afford her Januvia  • Will begin Jardiance 25mg po daily, PAP completed and faxed  • Will see back in 4 weeks to see how new medication is tolerated and to see if PAP approved.    - Medication changes:  • Dc januvia  • Start Jardiance 25mg po daily - samples given  •      - Lifestyle changes:  • Continue to reduce simple carbohydrate ingestion    Return in about 4 weeks (around 4/7/2022).    Mary Cisneros  03/10/22    CC:   CARMELO Herbert

## 2022-03-15 ENCOUNTER — DOCUMENTATION (OUTPATIENT)
Dept: VASCULAR LAB | Facility: MEDICAL CENTER | Age: 82
End: 2022-03-15
Payer: MEDICARE

## 2022-03-15 NOTE — PROGRESS NOTES
[facility]      Received correspondence from [facility] in regards to patient's application for assistance with cost of Jardiance.    is requesting missing information to be faxed he application submitted was incomplete and is missing the followin. Proof of income  2. Patient signature and Date on bottom of  Page 4    Called the patient and LVM to inform her about the missing information needed and requested that she gather the income verification and either bring in to her upcoming appt on 22 or drop by the clinic to turn in and sign page 4 so we can refax to .   Fax will be scanned into media for future reference.     Jennifer Harrison  PharmD      CC: Mary Cisneros

## 2022-03-24 ENCOUNTER — OFFICE VISIT (OUTPATIENT)
Dept: MEDICAL GROUP | Facility: PHYSICIAN GROUP | Age: 82
End: 2022-03-24
Payer: MEDICARE

## 2022-03-24 VITALS
SYSTOLIC BLOOD PRESSURE: 102 MMHG | HEART RATE: 62 BPM | OXYGEN SATURATION: 97 % | BODY MASS INDEX: 25.11 KG/M2 | WEIGHT: 160 LBS | RESPIRATION RATE: 20 BRPM | TEMPERATURE: 97.6 F | HEIGHT: 67 IN | DIASTOLIC BLOOD PRESSURE: 50 MMHG

## 2022-03-24 DIAGNOSIS — E11.69 HYPERLIPIDEMIA DUE TO TYPE 2 DIABETES MELLITUS (HCC): ICD-10-CM

## 2022-03-24 DIAGNOSIS — E78.5 HYPERLIPIDEMIA DUE TO TYPE 2 DIABETES MELLITUS (HCC): ICD-10-CM

## 2022-03-24 DIAGNOSIS — I95.9 HYPOTENSION, UNSPECIFIED HYPOTENSION TYPE: ICD-10-CM

## 2022-03-24 DIAGNOSIS — M81.0 AGE-RELATED OSTEOPOROSIS WITHOUT CURRENT PATHOLOGICAL FRACTURE: ICD-10-CM

## 2022-03-24 PROCEDURE — 99213 OFFICE O/P EST LOW 20 MIN: CPT | Performed by: NURSE PRACTITIONER

## 2022-03-24 ASSESSMENT — ENCOUNTER SYMPTOMS
FEVER: 0
WEIGHT LOSS: 0
CHILLS: 0
WEAKNESS: 0
HEADACHES: 0
SHORTNESS OF BREATH: 0
DIZZINESS: 0

## 2022-03-24 ASSESSMENT — FIBROSIS 4 INDEX: FIB4 SCORE: 1.88

## 2022-03-24 NOTE — ASSESSMENT & PLAN NOTE
Needs more Jardiance  Has follow up with Mary Filter for DM management  Is pending Patient assistance for cost of jaridance

## 2022-03-24 NOTE — ASSESSMENT & PLAN NOTE
Dexa Scan completed showing continued decrease in bone density   Gets prolia injections  Needs new infusion appt to be set up for prolia injections at [facility] in [facility]  Calcium was retested at 8.6

## 2022-03-24 NOTE — ASSESSMENT & PLAN NOTE
Office /50  Previous visits patient has had stable BP  Denies change in vision, lightheadedness, HA, worsening weakness

## 2022-03-24 NOTE — PROGRESS NOTES
CC:  Chief Complaint   Patient presents with   • Results     DEXA, MAMMO       HISTORY OF PRESENT ILLNESS: Patient is a 82 y.o. female established patient presenting for follow up on mammogram and Dexa scan      Age-related osteoporosis without current pathological fracture  Dexa Scan completed showing continued decrease in bone density   Gets prolia injections  Needs new infusion appt to be set up for prolia injections at cancer center in [facility]  Calcium was retested at 8.6        Low BP  Office /50  Previous visits patient has had stable BP  Denies change in vision, lightheadedness, HA, worsening weakness     Hyperlipidemia due to type 2 diabetes mellitus (HCC)  Needs more Jardiance  Has follow up with Mary Filter for DM management  Is pending Patient assistance for cost of jaridance      Patient Active Problem List    Diagnosis Date Noted   • Low BP 03/24/2022   • Fingertip amputation 02/16/2022   • Type 2 diabetes mellitus with other specified complication ([facility]) 01/25/2022   • 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 ([facility]) 01/25/2022   • 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 ([facility]) 01/25/2022   • Gastroesophageal reflux disease 01/04/2022   • Bowel and bladder incontinence 01/04/2022   • BMI 25.0-25.9,adult 12/21/2021   • Phantom pain ([facility]) 12/21/2021   • Hyperlipidemia due to type 2 diabetes mellitus ([facility]) 12/21/2021   • Hemiplegia as late effect of cerebrovascular accident (CVA) ([facility]) 01/07/2021   • Diabetic retinopathy of both eyes associated with type 2 diabetes mellitus ([facility]) 01/07/2021   • Age-related osteoporosis without current pathological fracture 10/25/2012      Allergies:Codeine, Lactose, Pcn [penicillins], Sulfa drugs, Azithromycin, Gluten meal, and Piperacillin sod-tazobactam so    Current Outpatient Medications   Medication Sig Dispense Refill   • calcium citrate (CALCITRATE) 950 (200 Ca) MG Tab Take 950 mg by mouth.     • vitamin D (VITAMIND D3) 1000 UNIT Tab Take 1,000  "Units by mouth every day.     • omeprazole (PRILOSEC) 20 MG delayed-release capsule Take 20 mg by mouth every day.     • BRIMONIDINE TARTRATE OP Administer  into affected eye(s).     • atorvastatin (LIPITOR) 10 MG Tab Take 1 Tablet by mouth every day. 90 Tablet 2   • acetaminophen (TYLENOL) 500 MG Tab      • aspirin (ASA) 81 MG Chew Tab chewable tablet 2 tab(s) orally once a day     • Biotin 10 MG Tab Take 1 Tablet by mouth every day.     • bisacodyl (DULCOLAX) 5 MG EC tablet 2 tabs as needed Orally Once a day for 30 day(s)     • Coenzyme Q10 200 MG Cap Take 1 Capsule by mouth every day.     • meclizine (ANTIVERT) 25 MG Tab Take 25 mg by mouth.     • Cyanocobalamin (B-12) 500 MCG Tab Take  by mouth.     • Calcium Carbonate-Vitamin D 600-200 MG-UNIT Tab Take  by mouth.       No current facility-administered medications for this visit.       Social History     Tobacco Use   • Smoking status: Former Smoker     Quit date: 1988     Years since quittin.2   • Smokeless tobacco: Never Used   Vaping Use   • Vaping Use: Never used   Substance Use Topics   • Alcohol use: No     Social History     Social History Narrative   • Not on file       Family History   Problem Relation Age of Onset   • Heart Failure Mother    • Heart Disease Father         Review of Systems   Constitutional: Negative for chills, fever, malaise/fatigue and weight loss.   Respiratory: Negative for shortness of breath.    Cardiovascular: Negative for chest pain.   Neurological: Negative for dizziness, weakness and headaches.   Psychiatric/Behavioral: Negative for suicidal ideas.             - NOTE: All other systems reviewed and are negative, except as in HPI.      Exam:    /50   Pulse 62   Temp 36.4 °C (97.6 °F) (Temporal)   Resp 20   Ht 1.702 m (5' 7\")   Wt 72.6 kg (160 lb)   SpO2 97%  Body mass index is 25.06 kg/m².    General: Alert, pleasant, NAD  EYES:   PERRL, EOMI, no icterus or pallor.  Conjunctivae and lids normal.   HENT:  " Normocephalic.  External ears normal.   Respiratory: Normal respiratory effort.   Skin: Warm, dry, no rashes.  Musculoskeletal: WC bound, bilateral BKA, right arm paralysis from past CVA  Extremities: No clubbing, cyanosis or edema noted.   Neurological: No tremors, right arm paralysis from past CVA  Psych:  Affect/mood is normal, judgement is good, memory is intact, grooming is appropriate.      LABS: Dexa Scan, Ca and Mammogram: Results reviewed and discussed with the patient, questions answered.    Assessment/Plan:  1. Age-related osteoporosis without current pathological fracture  Would like to get back into prolia injections    2. Hypotension, unspecified hypotension type  Continue to monitor  Provided pt with BP journal to use for next week and return to office    3. Hyperlipidemia due to type 2 diabetes mellitus (HCC)  Follow with Mary Filter 4/7/2022    Discussed with patient possible alternative diagnoses, patient is to take all medications as prescribed.     If symptoms persist FU w/PCP, if symptoms worsen go to emergency room.     If experiencing any side effects from prescribed medications reports to the office immediately or go to emergency room.    Reviewed indication, dosage, usage and potential adverse effects of prescribed medications.     Reviewed risks and benefits of treatment plan. Patient verbalizes understanding of all instruction and verbally agrees to plan.      Return in about 1 month (around 4/26/2022) for AWV.    My total time spent caring for the patient on the day of the encounter was 22 minutes.   This does not include time spent on separately billable procedures/tests.     Please note that this dictation was created using voice recognition software. I have made every reasonable attempt to correct obvious errors, but I expect that there are errors of grammar and possibly content that I did not discover before finalizing the note.

## 2022-04-07 ENCOUNTER — ANTICOAGULATION MONITORING (OUTPATIENT)
Dept: MEDICAL GROUP | Facility: PHYSICIAN GROUP | Age: 82
End: 2022-04-07

## 2022-04-07 ENCOUNTER — TELEPHONE (OUTPATIENT)
Dept: VASCULAR LAB | Facility: MEDICAL CENTER | Age: 82
End: 2022-04-07
Payer: MEDICARE

## 2022-04-07 ENCOUNTER — NON-PROVIDER VISIT (OUTPATIENT)
Dept: MEDICAL GROUP | Facility: PHYSICIAN GROUP | Age: 82
End: 2022-04-07
Payer: MEDICARE

## 2022-04-07 DIAGNOSIS — E11.65 TYPE 2 DIABETES MELLITUS WITH HYPERGLYCEMIA, WITHOUT LONG-TERM CURRENT USE OF INSULIN (HCC): ICD-10-CM

## 2022-04-07 LAB — GLUCOSE BLD-MCNC: 168 MG/DL (ref 70–100)

## 2022-04-07 PROCEDURE — 82962 GLUCOSE BLOOD TEST: CPT | Performed by: STUDENT IN AN ORGANIZED HEALTH CARE EDUCATION/TRAINING PROGRAM

## 2022-04-07 RX ORDER — EMPAGLIFLOZIN 25 MG/1
1 TABLET, FILM COATED ORAL DAILY
COMMUNITY

## 2022-04-07 NOTE — PATIENT INSTRUCTIONS
Pt should qualify for low income subsidy medicaid (for prescriptions) please apply by calling [phone] or apply online

## 2022-04-07 NOTE — NON-PROVIDER
Patient Consult Note    TIME IN: 9:55  TIME OUT: 10:22    Primary care physician: CARMELO Herbert    Reason for consult: Management of Uncontrolled Type 2 Diabetes    HPI:  Shireen Astorga is a 82 y.o. old patient who comes in today for evaluation of above stated problem.    Most Recent HbA1c and POCT glucose:   Lab Results   Component Value Date/Time    HBA1C 6.3 (A) 12/16/2021 12:00 AM            Most Recent Scr:  Lab Results   Component Value Date/Time    CREATININE 0.69 01/04/2022 11:12 AM        Diabetes Medication History and Current Regimen  Metformin: IR/ER not tolerated secondary to GI SE   GLP-1 Agent: none   SGLT-2 Inhibitor: Jardiance 25mg po daily           Hypoglycemia awareness - yes  Nocturnal hypoglycemia- none  Hypoglycemia:  None    Pt's treatment of Hypoglycemia - n/a  - 15:15 Rule    Current Exercise - none - pt s/p stroke with l sided michelle  Exercise Goal - 30 minutes daily    Dietary - common adult diet    Foot Exam:  Monofilament exam - current  Monofilament testing with a 10 gram force: sensation intact: decreased bilaterally.    Visual Inspection: Feet without maceration, ulcers, fissures.  Feet dry.  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) 01/25/2022   • 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 (HCC) 01/25/2022   • Gastroesophageal reflux disease 01/04/2022   • Bowel and bladder incontinence 01/04/2022   • BMI  25.0-25.9,adult 2021   • Phantom pain ([facility]) 2021   • Hyperlipidemia due to type 2 diabetes mellitus ([facility]) 2021   • Hemiplegia as late effect of cerebrovascular accident (CVA) ([facility]) 2021   • Diabetic retinopathy of both eyes associated with type 2 diabetes mellitus ([facility]) 2021   • Age-related osteoporosis without current pathological fracture 10/25/2012       Past Surgical History:  Past Surgical History:   Procedure Laterality Date   • ABDOMINAL HYSTERECTOMY TOTAL      bilateral salpingo-oophorectomy in  and    • HIP ARTHROPLASTY MIS TOTAL     • KNEE AMPUTATION BELOW Bilateral        Allergies:  Codeine, Lactose, Pcn [penicillins], Sulfa drugs, Azithromycin, Gluten meal, and Piperacillin sod-tazobactam so    Social History:  Social History     Socioeconomic History   • Marital status: Unknown     Spouse name: Not on file   • Number of children: Not on file   • Years of education: Not on file   • Highest education level: Not on file   Occupational History   • Not on file   Tobacco Use   • Smoking status: Former Smoker     Quit date: 1988     Years since quittin.2   • Smokeless tobacco: Never Used   Vaping Use   • Vaping Use: Never used   Substance and Sexual Activity   • Alcohol use: No   • Drug use: Not on file   • Sexual activity: Not on file   Other Topics Concern   • Not on file   Social History Narrative   • Not on file     Social Determinants of Health     Financial Resource Strain: Not on file   Food Insecurity: Not on file   Transportation Needs: Not on file   Physical Activity: Not on file   Stress: Not on file   Social Connections: Not on file   Intimate Partner Violence: Not on file   Housing Stability: Not on file       Family History:  Family History   Problem Relation Age of Onset   • Heart Failure Mother    • Heart Disease Father        Medications:    Current Outpatient Medications:   •  calcium citrate (CALCITRATE) 950 (200 Ca) MG Tab, Take 950 mg by mouth.,  Disp: , Rfl:   •  vitamin D (VITAMIND D3) 1000 UNIT Tab, Take 1,000 Units by mouth every day., Disp: , Rfl:   •  omeprazole (PRILOSEC) 20 MG delayed-release capsule, Take 20 mg by mouth every day., Disp: , Rfl:   •  BRIMONIDINE TARTRATE OP, Administer  into affected eye(s)., Disp: , Rfl:   •  atorvastatin (LIPITOR) 10 MG Tab, Take 1 Tablet by mouth every day., Disp: 90 Tablet, Rfl: 2  •  acetaminophen (TYLENOL) 500 MG Tab, , Disp: , Rfl:   •  aspirin (ASA) 81 MG Chew Tab chewable tablet, 2 tab(s) orally once a day, Disp: , Rfl:   •  Biotin 10 MG Tab, Take 1 Tablet by mouth every day., Disp: , Rfl:   •  bisacodyl (DULCOLAX) 5 MG EC tablet, 2 tabs as needed Orally Once a day for 30 day(s), Disp: , Rfl:   •  Coenzyme Q10 200 MG Cap, Take 1 Capsule by mouth every day., Disp: , Rfl:   •  meclizine (ANTIVERT) 25 MG Tab, Take 25 mg by mouth., Disp: , Rfl:   •  Cyanocobalamin (B-12) 500 MCG Tab, Take  by mouth., Disp: , Rfl:   •  Calcium Carbonate-Vitamin D 600-200 MG-UNIT Tab, Take  by mouth., Disp: , Rfl:     Labs: Reviewed    Physical Examination:  Vital signs: LMP 10/28/1989  There is no height or weight on file to calculate BMI.    Assessment and Plan:    1. DM2  • Pt tolerating Jardiance.  Pt did not qualify for PAP due to low income.  Pt encouraged to apply for medicaid.  Pt agrees to try to sign up today  • a1c at goal 6.3    - Medication changes:  • none     - Lifestyle changes:  • none    Follow Up:  4 weeks    Mary Cisneros    CC:   CARMELO Herbert

## 2022-04-07 NOTE — TELEPHONE ENCOUNTER
Spoke with [facility].  Pt currently does not qualify for PAP.  Pt needs to apply for low income subsidy/medicaid.  If denied, [facility] needs a copy of that denial letter to reconsider  Mary Cisneros, Clinical Pharmacist, CDE, CACP

## 2022-04-12 ENCOUNTER — TELEPHONE (OUTPATIENT)
Dept: MEDICAL GROUP | Facility: PHYSICIAN GROUP | Age: 82
End: 2022-04-12
Payer: MEDICARE

## 2022-04-14 ENCOUNTER — OFFICE VISIT (OUTPATIENT)
Dept: MEDICAL GROUP | Facility: PHYSICIAN GROUP | Age: 82
End: 2022-04-14
Payer: MEDICARE

## 2022-04-14 VITALS
BODY MASS INDEX: 25.11 KG/M2 | OXYGEN SATURATION: 96 % | TEMPERATURE: 96.8 F | HEIGHT: 67 IN | DIASTOLIC BLOOD PRESSURE: 72 MMHG | HEART RATE: 66 BPM | WEIGHT: 160 LBS | RESPIRATION RATE: 12 BRPM | SYSTOLIC BLOOD PRESSURE: 116 MMHG

## 2022-04-14 DIAGNOSIS — H20.00 ACUTE CYCLITIS: ICD-10-CM

## 2022-04-14 DIAGNOSIS — R30.0 DYSURIA: ICD-10-CM

## 2022-04-14 PROCEDURE — 99214 OFFICE O/P EST MOD 30 MIN: CPT | Performed by: NURSE PRACTITIONER

## 2022-04-14 RX ORDER — CEFDINIR 300 MG/1
300 CAPSULE ORAL 2 TIMES DAILY
Qty: 14 CAPSULE | Refills: 0 | Status: SHIPPED | OUTPATIENT
Start: 2022-04-14 | End: 2022-04-26

## 2022-04-14 RX ORDER — BRIMONIDINE TARTRATE 2 MG/ML
SOLUTION/ DROPS OPHTHALMIC
COMMUNITY
Start: 2022-03-25

## 2022-04-14 RX ORDER — PHENAZOPYRIDINE HYDROCHLORIDE 200 MG/1
200 TABLET, FILM COATED ORAL 3 TIMES DAILY PRN
Qty: 6 TABLET | Refills: 0 | Status: SHIPPED | OUTPATIENT
Start: 2022-04-14 | End: 2022-04-26

## 2022-04-14 ASSESSMENT — FIBROSIS 4 INDEX: FIB4 SCORE: 1.88

## 2022-04-14 NOTE — PROGRESS NOTES
CC:  Chief Complaint   Patient presents with   • UTI       HISTORY OF PRESENT ILLNESS: Patient is a 82 y.o. female established patient presenting pain with urination, frequency of urination.    1. Dysuria  Patient states that roughly 7 days ago she started to have pain with urination and lower pelvic pain.  She states that she believes she had a UTI and started taking Azo 3 times a day.  She denies that this medication has been helping her.  She denies chills or fever, nausea or vomiting, hematuria.  She also states that she started to have some bilateral flank pain and lower pelvic pain. She is drinking water.           Allergies:Codeine, Lactose, Pcn [penicillins], Sulfa drugs, Azithromycin, Gluten meal, and Piperacillin sod-tazobactam so    Current Outpatient Medications   Medication Sig Dispense Refill   • cefdinir (OMNICEF) 300 MG Cap Take 1 Capsule by mouth 2 times a day. 14 Capsule 0   • phenazopyridine (PYRIDIUM) 200 MG Tab Take 1 Tablet by mouth 3 times a day as needed for Mild Pain. 6 Tablet 0   • Empagliflozin (JARDIANCE) 25 MG Tab Take 1 Tablet by mouth every day.     • calcium citrate (CALCITRATE) 950 (200 Ca) MG Tab Take 950 mg by mouth.     • vitamin D (VITAMIND D3) 1000 UNIT Tab Take 1,000 Units by mouth every day.     • omeprazole (PRILOSEC) 20 MG delayed-release capsule Take 20 mg by mouth every day.     • atorvastatin (LIPITOR) 10 MG Tab Take 1 Tablet by mouth every day. 90 Tablet 2   • acetaminophen (TYLENOL) 500 MG Tab      • aspirin (ASA) 81 MG Chew Tab chewable tablet 2 tab(s) orally once a day     • Biotin 10 MG Tab Take 1 Tablet by mouth every day.     • bisacodyl (DULCOLAX) 5 MG EC tablet 2 tabs as needed Orally Once a day for 30 day(s)     • Coenzyme Q10 200 MG Cap Take 1 Capsule by mouth every day.     • meclizine (ANTIVERT) 25 MG Tab Take 25 mg by mouth.     • Cyanocobalamin (B-12) 500 MCG Tab Take  by mouth.     • Calcium Carbonate-Vitamin D 600-200 MG-UNIT Tab Take  by mouth.     •  "brimonidine (ALPHAGAN) 0.2 % Solution INSTILL 1 DROP INTO BOTH EYES TWICE A DAY       No current facility-administered medications for this visit.       Social History     Tobacco Use   • Smoking status: Former Smoker     Quit date: 1988     Years since quittin.3   • Smokeless tobacco: Never Used   Vaping Use   • Vaping Use: Never used   Substance Use Topics   • Alcohol use: No     Social History     Social History Narrative   • Not on file       Family History   Problem Relation Age of Onset   • Heart Failure Mother    • Heart Disease Father         ROS   See HPI      - NOTE: All other systems reviewed and are negative, except as in HPI.      Exam:    /72   Pulse 66   Temp 36 °C (96.8 °F) (Temporal)   Resp 12   Ht 1.702 m (5' 7\")   Wt 72.6 kg (160 lb)   SpO2 96%  Body mass index is 25.06 kg/m².    General: Alert, pleasant, NAD  EYES:   PERRL, EOMI, no icterus or pallor.  Conjunctivae and lids normal.   HENT:  Normocephalic.  External ears normal.   Abdomen: Non-distended, soft, tender in her lower pelvic area, no guarding/rebound. Bowel sounds present.  No hepatosplenomegaly.  No hernias.  No CVA tenderness   skin: Warm, dry, no rashes.  Psych:  Affect/mood is normal, judgement is good, memory is intact, grooming is appropriate.      Assessment/Plan:  .Dysuria  Patient unfortunately brought in urine to get tested and unfortunately we are unable to run this urine.  Discussed with patient concern for running urine but was not collected properly.  Discussed with patient that we can attempt to get a urine sample here today.  We will treat her empirically due to her having dysuria, frequency.  We will start her on cefdinir and follow-up with her in 1 week    1. Dysuria  URINALYSIS,CULTURE IF INDICATED    CANCELED: URINALYSIS,CULTURE IF INDICATED   2. Acute cyclitis  cefdinir (OMNICEF) 300 MG Cap    phenazopyridine (PYRIDIUM) 200 MG Tab       Discussed with patient possible alternative diagnoses, " patient is to take all medications as prescribed.     If symptoms persist FU w/PCP, if symptoms worsen go to emergency room.     If experiencing any side effects from prescribed medications reports to the office immediately or go to emergency room.    Reviewed indication, dosage, usage and potential adverse effects of prescribed medications.     Reviewed risks and benefits of treatment plan. Patient verbalizes understanding of all instruction and verbally agrees to plan.      Return in about 1 week (around 4/21/2022) for follow up no UTI.     Please note that this dictation was created using voice recognition software. I have made every reasonable attempt to correct obvious errors, but I expect that there are errors of grammar and possibly content that I did not discover before finalizing the note.

## 2022-04-14 NOTE — ASSESSMENT & PLAN NOTE
Patient unfortunately brought in urine to get tested and unfortunately we are unable to run this urine.  Discussed with patient concern for running urine but was not collected properly.  Discussed with patient that we can attempt to get a urine sample here today.  We will treat her empirically due to her having dysuria, frequency.  We will start her on cefdinir and follow-up with her in 1 week

## 2022-04-22 ENCOUNTER — TELEPHONE (OUTPATIENT)
Dept: MEDICAL GROUP | Facility: PHYSICIAN GROUP | Age: 82
End: 2022-04-22
Payer: MEDICARE

## 2022-04-22 NOTE — TELEPHONE ENCOUNTER
Pt LVM stating that we did not fax over her lab order for her UA. Pt was unhappy and wants a refill for gabapentin and penicillins asap. Pt would like a call back. Pharmacy [facility] on [address].

## 2022-04-23 ENCOUNTER — OFFICE VISIT (OUTPATIENT)
Dept: URGENT CARE | Facility: CLINIC | Age: 82
End: 2022-04-23
Payer: MEDICARE

## 2022-04-23 VITALS
HEIGHT: 67 IN | TEMPERATURE: 98.3 F | RESPIRATION RATE: 18 BRPM | OXYGEN SATURATION: 94 % | DIASTOLIC BLOOD PRESSURE: 62 MMHG | BODY MASS INDEX: 25.11 KG/M2 | SYSTOLIC BLOOD PRESSURE: 116 MMHG | WEIGHT: 160 LBS | HEART RATE: 66 BPM

## 2022-04-23 DIAGNOSIS — R39.9 LOWER URINARY TRACT SYMPTOMS (LUTS): ICD-10-CM

## 2022-04-23 PROCEDURE — 99214 OFFICE O/P EST MOD 30 MIN: CPT | Performed by: PHYSICIAN ASSISTANT

## 2022-04-23 RX ORDER — NITROFURANTOIN 25; 75 MG/1; MG/1
100 CAPSULE ORAL 2 TIMES DAILY
Qty: 10 CAPSULE | Refills: 0 | Status: SHIPPED | OUTPATIENT
Start: 2022-04-23 | End: 2022-04-26

## 2022-04-23 ASSESSMENT — ENCOUNTER SYMPTOMS
COUGH: 0
SHORTNESS OF BREATH: 0
FEVER: 0
NAUSEA: 0
WHEEZING: 0
DIARRHEA: 0
ABDOMINAL PAIN: 0
VOMITING: 0
CHILLS: 0
BACK PAIN: 1

## 2022-04-23 ASSESSMENT — FIBROSIS 4 INDEX: FIB4 SCORE: 1.88

## 2022-04-23 NOTE — TELEPHONE ENCOUNTER
Attempted to call patient at 6 PM at phone number provided in chart [phone] however the mailbox was not set up and was not taking messages at this time.  We will attempt to call patient on Monday

## 2022-04-23 NOTE — PROGRESS NOTES
Subjective     Shireen Astorga is a 82 y.o. female who presents with Medication Reaction (Mouth swelled in reaction to penicillin derivative, took whole course with benadryl.)            The patient is here accompanied by her son and is  requesting a different antibiotic for her UTI. She saw her PCP 9 days ago and was prescribed Cefdinir for her UTI.  She states the medication caused her mouth and throat to swell but she took the entire medication in combination with Benadryl. She is here today because her symptoms have not improved. She continues to have dysuria, frequency, urgency and lower back pain. She denies any chest pain, shortness of breath, or difficulty breathing. She was unable to provide a sample for us in the office today. She states she provided a sample on 4/14/2022 to [facility]. Unfortunately, [facility] is not open today so we cannot obtain those records.       Past Medical History:   Diagnosis Date   • CVA (cerebral vascular accident) ([facility])    • Neuropathic peripheral nerve    • OSTEOPOROSIS    • Right knee injury     meniscus damage   • S/P BKA (below knee amputation) ([facility])        Past Surgical History:   Procedure Laterality Date   • ABDOMINAL HYSTERECTOMY TOTAL      bilateral salpingo-oophorectomy in 89 and 89   • HIP ARTHROPLASTY MIS TOTAL     • KNEE AMPUTATION BELOW Bilateral        .  Family History   Problem Relation Age of Onset   • Heart Failure Mother    • Heart Disease Father        Allergies   Allergen Reactions   • Cefdinir    • Codeine Vomiting   • Lactose Diarrhea     Diarrhea   • Pcn [Penicillins]      localize   • Sulfa Drugs Vomiting and Anaphylaxis   • Azithromycin Rash   • Gluten Meal Nausea     GI upset  GI upset   • Piperacillin Sod-Tazobactam So Rash       Medications, Allergies, and current problem list reviewed today in [facility]    Review of Systems   Constitutional: Positive for malaise/fatigue. Negative for chills and fever.   HENT:        Mild residual throat swelling   "  Respiratory: Negative for cough, shortness of breath and wheezing.    Cardiovascular: Negative for chest pain.   Gastrointestinal: Negative for abdominal pain, diarrhea, nausea and vomiting.   Genitourinary: Positive for dysuria, frequency and urgency. Negative for hematuria.   Musculoskeletal: Positive for back pain.     All other systems reviewed and are negative.            Objective     /62 (BP Location: Left arm, Patient Position: Sitting, BP Cuff Size: Adult)   Pulse 66   Temp 36.8 °C (98.3 °F) (Temporal)   Resp 18   Ht 1.702 m (5' 7\")   Wt 72.6 kg (160 lb)   LMP 10/28/1989   SpO2 94%   BMI 25.06 kg/m²      Physical Exam  Constitutional:       General: She is not in acute distress.     Appearance: She is not ill-appearing.   HENT:      Head: Normocephalic and atraumatic.      Mouth/Throat:      Mouth: Mucous membranes are moist.      Pharynx: No posterior oropharyngeal erythema.      Comments: Oropharynx without edema. Airway patent   Eyes:      Conjunctiva/sclera: Conjunctivae normal.   Cardiovascular:      Rate and Rhythm: Normal rate and regular rhythm.   Pulmonary:      Effort: Pulmonary effort is normal. No respiratory distress.      Breath sounds: No stridor. No wheezing.   Skin:     General: Skin is warm and dry.   Neurological:      General: No focal deficit present.      Mental Status: She is alert and oriented to person, place, and time.   Psychiatric:         Mood and Affect: Mood normal.         Behavior: Behavior normal.         Thought Content: Thought content normal.         Judgment: Judgment normal.                             Assessment & Plan        1. Lower urinary tract symptoms (LUTS)    CrCl calculated from labs obtained on 2/17/2022- 71.20   Macrobid is acceptable to treat patient. Patient has follow-up with PCP in 3 days.   - nitrofurantoin (MACROBID) 100 MG Cap; Take 1 Capsule by mouth 2 times a day for 5 days.  Dispense: 10 Capsule; Refill: 0       Differential " diagnoses, Supportive care, and indications for immediate follow-up discussed with patient.   Pathogenesis of diagnosis discussed including typical length and natural progression.   Instructed to return to clinic or nearest emergency department for any change in condition, further concerns, or worsening of symptoms.    The patient demonstrated a good understanding and agreed with the treatment plan.    Stacy Martínez P.A.-C.

## 2022-04-26 ENCOUNTER — OFFICE VISIT (OUTPATIENT)
Dept: MEDICAL GROUP | Facility: PHYSICIAN GROUP | Age: 82
End: 2022-04-26
Payer: MEDICARE

## 2022-04-26 VITALS
BODY MASS INDEX: 25.11 KG/M2 | HEIGHT: 67 IN | OXYGEN SATURATION: 92 % | SYSTOLIC BLOOD PRESSURE: 126 MMHG | TEMPERATURE: 97.7 F | DIASTOLIC BLOOD PRESSURE: 68 MMHG | WEIGHT: 160 LBS | RESPIRATION RATE: 12 BRPM | HEART RATE: 65 BPM

## 2022-04-26 DIAGNOSIS — I69.359 HEMIPLEGIA AS LATE EFFECT OF CEREBROVASCULAR ACCIDENT (CVA) (HCC): ICD-10-CM

## 2022-04-26 DIAGNOSIS — M24.549 CONTRACTURE OF HAND: ICD-10-CM

## 2022-04-26 DIAGNOSIS — B37.31 YEAST VAGINITIS: ICD-10-CM

## 2022-04-26 DIAGNOSIS — S68.119D AMPUTATION OF TIP OF FINGER, SUBSEQUENT ENCOUNTER: ICD-10-CM

## 2022-04-26 DIAGNOSIS — E11.69 TYPE 2 DIABETES MELLITUS WITH OTHER SPECIFIED COMPLICATION, WITHOUT LONG-TERM CURRENT USE OF INSULIN (HCC): ICD-10-CM

## 2022-04-26 DIAGNOSIS — R42 VERTIGO: ICD-10-CM

## 2022-04-26 DIAGNOSIS — G54.6 PHANTOM PAIN: ICD-10-CM

## 2022-04-26 DIAGNOSIS — R39.81 FUNCTIONAL URINARY INCONTINENCE: ICD-10-CM

## 2022-04-26 DIAGNOSIS — E11.319 DIABETIC RETINOPATHY OF BOTH EYES ASSOCIATED WITH TYPE 2 DIABETES MELLITUS, MACULAR EDEMA PRESENCE UNSPECIFIED, UNSPECIFIED RETINOPATHY SEVERITY (HCC): ICD-10-CM

## 2022-04-26 DIAGNOSIS — M81.0 AGE-RELATED OSTEOPOROSIS WITHOUT CURRENT PATHOLOGICAL FRACTURE: ICD-10-CM

## 2022-04-26 DIAGNOSIS — Z89.512 ACQUIRED ABSENCE OF LEFT LEG BELOW KNEE (HCC): ICD-10-CM

## 2022-04-26 DIAGNOSIS — Z89.511 ACQUIRED ABSENCE OF RIGHT LOWER EXTREMITY BELOW KNEE (HCC): ICD-10-CM

## 2022-04-26 DIAGNOSIS — K21.9 GASTROESOPHAGEAL REFLUX DISEASE, UNSPECIFIED WHETHER ESOPHAGITIS PRESENT: ICD-10-CM

## 2022-04-26 PROBLEM — I95.9 LOW BP: Status: RESOLVED | Noted: 2022-03-24 | Resolved: 2022-04-26

## 2022-04-26 PROCEDURE — 99213 OFFICE O/P EST LOW 20 MIN: CPT | Mod: 25 | Performed by: NURSE PRACTITIONER

## 2022-04-26 PROCEDURE — G0439 PPPS, SUBSEQ VISIT: HCPCS | Performed by: NURSE PRACTITIONER

## 2022-04-26 RX ORDER — GABAPENTIN 600 MG/1
600 TABLET ORAL 2 TIMES DAILY
Qty: 180 TABLET | Refills: 0 | Status: SHIPPED | OUTPATIENT
Start: 2022-04-26 | End: 2022-07-01 | Stop reason: SDUPTHER

## 2022-04-26 ASSESSMENT — PATIENT HEALTH QUESTIONNAIRE - PHQ9: CLINICAL INTERPRETATION OF PHQ2 SCORE: 0

## 2022-04-26 ASSESSMENT — FIBROSIS 4 INDEX: FIB4 SCORE: 1.88

## 2022-04-26 ASSESSMENT — ACTIVITIES OF DAILY LIVING (ADL): BATHING_REQUIRES_ASSISTANCE: 0

## 2022-04-26 ASSESSMENT — ENCOUNTER SYMPTOMS: GENERAL WELL-BEING: GOOD

## 2022-04-26 NOTE — ASSESSMENT & PLAN NOTE
Chronic and stable condition   Left BKA in 2007  Checks her stumps daily at bedtime to look for any sores  prothesis are fitting well  [facility] prothesis

## 2022-04-26 NOTE — PROGRESS NOTES
Chief Complaint   Patient presents with   • Annual Exam     AWV Medicare   • Referral Needed     Gabapentin         HPI:  Shireen is a 82 y.o. 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 ([facility]) 01/25/2022   • 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 ([facility]) 01/25/2022   • Gastroesophageal reflux disease 01/04/2022   • Functional urinary incontinence 01/04/2022   • BMI 25.0-25.9,adult 12/21/2021   • Phantom pain ([facility]) 12/21/2021   • Hyperlipidemia due to type 2 diabetes mellitus ([facility]) 12/21/2021   • Hemiplegia as late effect of cerebrovascular accident (CVA) ([facility]) 01/07/2021   • Diabetic retinopathy of both eyes associated with type 2 diabetes mellitus ([facility]) 01/07/2021   • Age-related osteoporosis without current pathological fracture 10/25/2012       Current Outpatient Medications   Medication Sig Dispense Refill   • denosumab (PROLIA) 60 MG/ML Solution Prefilled Syringe injection Inject 1 mL under the skin every 6 months. 1 mL 0   • nystatin (MYCOSTATIN) powder Apply 1 g topically 2 times a day. 15 g 0   • gabapentin (NEURONTIN) 600 MG tablet Take 1 Tablet by mouth 2 times a day. 180 Tablet 0   • miconazole (MONISTAT-7) 100 MG Suppos Insert 1 Suppository into the vagina every evening. 7 Suppository 0   • brimonidine (ALPHAGAN) 0.2 % Solution INSTILL 1 DROP INTO BOTH EYES TWICE A DAY     • Empagliflozin (JARDIANCE) 25 MG Tab Take 1 Tablet by mouth every day.     • calcium citrate (CALCITRATE) 950 (200 Ca) MG Tab Take 950 mg by mouth.     • vitamin D (VITAMIND D3) 1000 UNIT Tab Take 1,000 Units by mouth every day.     • omeprazole (PRILOSEC) 20 MG delayed-release capsule Take 20 mg by mouth  every day.     • atorvastatin (LIPITOR) 10 MG Tab Take 1 Tablet by mouth every day. 90 Tablet 2   • acetaminophen (TYLENOL) 500 MG Tab      • aspirin (ASA) 81 MG Chew Tab chewable tablet 2 tab(s) orally once a day     • Biotin 10 MG Tab Take 1 Tablet by mouth every day.     • Coenzyme Q10 200 MG Cap Take 1 Capsule by mouth every day.     • meclizine (ANTIVERT) 25 MG Tab Take 25 mg by mouth.     • Cyanocobalamin (B-12) 500 MCG Tab Take  by mouth.     • Calcium Carbonate-Vitamin D 600-200 MG-UNIT Tab Take  by mouth.       No current facility-administered medications for this visit.        Patient is taking medications as noted in medication list.  Current supplements as per medication list.     Allergies: Cefdinir, Codeine, Lactose, Pcn [penicillins], Sulfa drugs, Azithromycin, Gluten meal, and Piperacillin sod-tazobactam so    Current social contact/activities:    [place]    Is patient current with immunizations? Yes.    She  reports that she quit smoking about 34 years ago. She has never used smokeless tobacco. She reports that she does not drink alcohol.  Counseling given: Not Answered        DPA/Advanced directive: Patient has Advanced Directive, but it is not on file. Instructed to bring in a copy to scan into their chart.    ROS:    Gait: Uses a wheelchair   Ostomy: No   Other tubes: No   Amputations: Yes bilateral BKA   Chronic oxygen use No   Last eye exam next exam June   Wears hearing aids: No   : Reports urinary leakage during the last 6 months that has not interfered at all with their daily activities or sleep. History of  since CVA 1973    Screening:      Depression Screening  Little interest or pleasure in doing things?  0 - not at all  Feeling down, depressed, or hopeless? 0 - not at all  Patient Health Questionnaire Score: 0    If depressive symptoms identified deferred to follow up visit unless specifically addressed in assessment and plan.    Interpretation of PHQ-9 Total Score   Score  Severity   1-4 No Depression   5-9 Mild Depression   10-14 Moderate Depression   15-19 Moderately Severe Depression   20-27 Severe Depression    Screening for Cognitive Impairment  Three Minute Recall (daughter, heaven, mountain)  3/3    Ravinder clock face with all 12 numbers and set the hands to show 10 past 11.  Yes    If cognitive concerns identified, deferred for follow up unless specifically addressed in assessment and plan.    Fall Risk Assessment  Has the patient had two or more falls in the last year or any fall with injury in the last year?  No  If fall risk identified, deferred for follow up unless specifically addressed in assessment and plan.    Safety Assessment  Throw rugs on floor.  No  Handrails on all stairs.  Yes  Good lighting in all hallways.  Yes  Difficulty hearing.  No  Patient counseled about all safety risks that were identified.    Functional Assessment ADLs  Are there any barriers preventing you from cooking for yourself or meeting nutritional needs?  No.    Are there any barriers preventing you from driving safely or obtaining transportation?  No.    Are there any barriers preventing you from using a telephone or calling for help?  No.    Are there any barriers preventing you from shopping?  Yes.    Are there any barriers preventing you from taking care of your own finances?  Yes.    Are there any barriers preventing you from managing your medications?  No.    Are there any barriers preventing you from showering, bathing or dressing yourself?  No.    Are you currently engaging in any exercise or physical activity?  No.     What is your perception of your health?  Good.    Health Maintenance Summary          Overdue - Annual Wellness Visit (Every 366 Days) Overdue - never done    No completion history exists for this topic.          Overdue - RETINAL SCREENING (Yearly) Overdue - never done    No completion history exists for this topic.          Ordered - A1C SCREENING (Every 6 Months)  Ordered on 4/7/2022 12/16/2021  POCT Hemoglobin A1c    12/16/2021  POCT A1C    05/27/2021  HEMOGLOBIN A1C    12/22/2020  HEMOGLOBIN A1C          FASTING LIPID PROFILE (Yearly) Next due on 1/4/2023 01/04/2022  Lipid Profile    10/30/2012  LIPID PROFILE          URINE ACR / MICROALBUMIN (Yearly) Next due on 1/4/2023 01/04/2022  MICROALBUMIN CREAT RATIO URINE          SERUM CREATININE (Yearly) Next due on 2/17/2023 02/17/2022  BASIC METABOLIC PANEL    01/04/2022  Comp Metabolic Panel    10/30/2012  CMP          MAMMOGRAM (Yearly) Next due on 3/3/2023    03/03/2022  MA-SCREENING MAMMO BILAT W/TOMOSYNTHESIS W/CAD    02/18/2021  Outside Procedure: [facility] MA-SCREEN MAMMO UNI W/CAD    11/30/2012  MAMMO,DIGITAL - SCREENING          BONE DENSITY (Every 5 Years) Tentatively due on 2/24/2027 02/24/2022  DS-BONE DENSITY STUDY (DEXA)          IMM DTaP/Tdap/Td Vaccine (2 - Td or Tdap) Next due on 10/5/2029    10/05/2019  Imm Admin: Tdap Vaccine          IMM PNEUMOCOCCAL VACCINE: 65+ Years (Series Information) Completed    12/07/2014  Imm Admin: Pneumococcal Conjugate Vaccine (Prevnar/PCV-13)    11/25/2014  Imm Admin: Pneumococcal Conjugate Vaccine (Prevnar/PCV-13)    05/29/2014  Imm Admin: Pneumococcal polysaccharide vaccine (PPSV-23)    01/01/2011  Imm Admin: Pneumococcal polysaccharide vaccine (PPSV-23)          IMM ZOSTER VACCINES (Series Information) Completed    11/12/2019  Imm Admin: Zoster Vaccine Recombinant (RZV) (SHINGRIX)    08/15/2019  Imm Admin: Zoster Vaccine Recombinant (RZV) (SHINGRIX)    07/02/2013  Imm Admin: Zoster Vaccine Live (ZVL) (Zostavax) - HISTORICAL DATA          IMM INFLUENZA (Series Information) Completed    11/17/2021  Imm Admin: Influenza Vaccine Adult HD    10/22/2020  Imm Admin: Influenza Vaccine Adult HD    10/28/2019  Imm Admin: Influenza Vaccine Adult HD    10/16/2018  Imm Admin: Influenza Vaccine Adult HD    10/16/2017  Imm Admin: Influenza Vaccine Adult HD    Only the first 5  "history entries have been loaded, but more history exists.          COVID-19 Vaccine (Series Information) Completed    2021  Imm Admin: Pfizer SARS-CoV-2 Vaccine +    2021  Imm Admin: Pfizer SARS-CoV-2 Vaccine +    2021  Imm Admin: Pfizer SARS-CoV-2 Vaccine 12+          IMM HEP B VACCINE (Series Information) Aged Out    No completion history exists for this topic.          IMM MENINGOCOCCAL VACCINE (MCV4) (Series Information) Aged Out    No completion history exists for this topic.          Discontinued - PAP SMEAR  Discontinued    No completion history exists for this topic.          Discontinued - COLORECTAL CANCER SCREENING  Discontinued    No completion history exists for this topic.          Discontinued - DIABETES MONOFILAMENT / LE EXAM  Discontinued    No completion history exists for this topic.                Patient Care Team:  CARMELO Herbert as PCP - General (Nurse Practitioner Primary Care)  CARMELO Paulino as PCP - [facility] Paneled    Social History     Tobacco Use   • Smoking status: Former Smoker     Quit date: 1988     Years since quittin.3   • Smokeless tobacco: Never Used   Vaping Use   • Vaping Use: Never used   Substance Use Topics   • Alcohol use: No     Family History   Problem Relation Age of Onset   • Heart Failure Mother    • Heart Disease Father      She  has a past medical history of CVA (cerebral vascular accident) ([facility]), Neuropathic peripheral nerve, OSTEOPOROSIS, Right knee injury, and S/P BKA (below knee amputation) ([facility]).   Past Surgical History:   Procedure Laterality Date   • ABDOMINAL HYSTERECTOMY TOTAL      bilateral salpingo-oophorectomy in  and    • HIP ARTHROPLASTY MIS TOTAL     • KNEE AMPUTATION BELOW Bilateral            Exam:     /68   Pulse 65   Temp 36.5 °C (97.7 °F) (Temporal)   Resp 12   Ht 1.702 m (5' 7\")   Wt 72.6 kg (160 lb)   SpO2 92%  Body mass index is 25.06 kg/m².    Hearing excellent.    Dentition " fair  Alert, oriented in no acute distress.  Eye contact is good, speech goal directed, affect calm      Assessment and Plan. 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 Patient has since had calcium return to normal and no follow with with [facility]  Will contact today and follow up  Currently taking Calcium and Vitamin D      BMI 25.0-25.9,adult  Chronic and stable condition   Patient is only able to stand at her house with her vertical pole in the toilet and shower  Is able to stand for weight in the office   Weight is stated weight  Unable to get weight at the residence she is staying at     Functional urinary incontinence  Chronic and stable condition   since her CVA she has had difficulty with knowing when to urinate   She will time her self to go to the bathroom and has decreased her incontinence issues    Diabetic retinopathy of both eyes associated with type 2 diabetes mellitus (HCC)  Chronic and stable condition   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 BLUE  Has worked on it before with little  improvement   Tries to do self therapy on movement and working on grabbing things with hand    Gastroesophageal reflux disease  Chronic and stable condition   Currently takes omeprazole daily in the AM  Continue with Omeprazole    Hemiplegia as late effect of cerebrovascular accident (CVA) (HCC)  Chronic and stable condition   Takes ASA, Atorvastatin   Right sided deficits     Vertigo  Chronic and stable condition   Takes meclizine as needed for dizziness    Type 2 diabetes mellitus with other specified complication ([facility])  Chronic and stable condition   Taking Jardiance   Is working on getting patient assistance  follows with diabetic pharmacist 5/5/2022 is next appointment    Phantom pain ([facility])  Chronic and stable condition   Bilateral BKA  Takes 600mg gabapentin at night sometimes during the day   Needs refills    Yeast vaginitis  While completing annual wellness visit patient complains that she still is having some pain with urination.  She states that it does not hurt to pee however the skin around her labia's are painful and irritated. She has already been treated for UTI states those symptoms improved. Labs reviewed from  at [facility] show elevated specific gravity, turbid appearance, yeast.  We will go ahead and provide patient with Monistat suppositories for 7 days as well as nystatin patient will follow-up in 1 week    1. 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 Age-related osteoporosis without current pathological fracture  denosumab (PROLIA) 60 MG/ML Solution Prefilled Syringe injection   4. BMI 25.0-25.9,adult     5. Functional urinary incontinence     6. 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 Amputation of tip of finger, subsequent encounter     8. Contracture of hand     9. Gastroesophageal reflux disease,  unspecified whether esophagitis present     10. Hemiplegia as late effect of cerebrovascular accident (CVA) ([facility])     11. Vertigo     12. Phantom pain ([facility])  gabapentin (NEURONTIN) 600 MG tablet   13. Type 2 diabetes mellitus with other specified complication, without long-term current use of insulin ([facility])     14. Yeast vaginitis  miconazole (MONISTAT-7) 100 MG Suppos    nystatin (MYCOSTATIN) powder    DISCONTINUED: miconazole (MICOTIN) 2 % Cream       Services suggested: No services needed at this time  Health Care Screening recommendations as per orders if indicated.  Referrals offered: PT/OT/Nutrition counseling/Behavioral Health/Smoking cessation as per orders if indicated.    Discussion today about general wellness and lifestyle habits:    · Prevent falls and reduce trip hazards; Cautioned about securing or removing rugs.  · Have a working fire alarm and carbon monoxide detector;   · Engage in regular physical activity and social activities       Follow-up: Return in about 1 week (around 5/3/2022).

## 2022-04-26 NOTE — ASSESSMENT & PLAN NOTE
Chronic and stable condition since 1973 CVA  Has worked on it before with little improvement   Tries to do self therapy on movement and working on grabbing things with hand

## 2022-04-26 NOTE — ASSESSMENT & PLAN NOTE
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 Pineda at [facility] Q 3 months

## 2022-04-26 NOTE — ASSESSMENT & PLAN NOTE
Chronic and stable condition   Patient is only able to stand at her house with her vertical pole in the toilet and shower  Is able to stand for weight in the office   Weight is stated weight  Unable to get weight at the residence she is staying at

## 2022-04-26 NOTE — ASSESSMENT & PLAN NOTE
Chronic and stable condition   Currently takes omeprazole daily in the AM  Continue with Omeprazole

## 2022-04-26 NOTE — ASSESSMENT & PLAN NOTE
Chronic and stable condition   Bilateral BKA  Takes 600mg gabapentin at night sometimes during the day   Needs refills

## 2022-04-26 NOTE — ASSESSMENT & PLAN NOTE
Chronic and stable condition   Was getting prolia injections but due to low calcium they did not do last one. Patient has since had calcium return to normal and no follow with with [facility]  Will contact today and follow up  Currently taking Calcium and Vitamin D

## 2022-04-26 NOTE — ASSESSMENT & PLAN NOTE
Chronic and stable condition   since her CVA she has had difficulty with knowing when to urinate   She will time her self to go to the bathroom and has decreased her incontinence issues

## 2022-04-26 NOTE — ASSESSMENT & PLAN NOTE
Chronic and stable condition   Taking Jardiance   Is working on getting patient assistance  follows with diabetic pharmacist 5/5/2022 is next appointment

## 2022-04-26 NOTE — ASSESSMENT & PLAN NOTE
Chronic and stable condition   Checks stumps nightly to check for skin breakdown   Prothesis are fitting well   Ultra manages prothesis

## 2022-04-27 ENCOUNTER — TELEPHONE (OUTPATIENT)
Dept: MEDICAL GROUP | Facility: PHYSICIAN GROUP | Age: 82
End: 2022-04-27
Payer: MEDICARE

## 2022-04-27 PROBLEM — B37.31 YEAST VAGINITIS: Status: ACTIVE | Noted: 2022-04-27

## 2022-04-27 RX ORDER — NYSTATIN 100000 [USP'U]/G
1 POWDER TOPICAL 2 TIMES DAILY
Qty: 15 G | Refills: 0 | Status: SHIPPED | OUTPATIENT
Start: 2022-04-27

## 2022-04-27 NOTE — TELEPHONE ENCOUNTER
Phone Number Called: [phone] (home)       Call outcome: Left detailed message for patient. Informed to call back with any additional questions.    Message: left voicemail for patient to give us a call back at [phone]. I was returning her voicemail in regards to one of the medication that Stacy had prescribed that [facility] pharmacy did not receive. I was calling for mor clarification.     Voicemail received 4/27 @ 9:31 AM

## 2022-04-27 NOTE — ASSESSMENT & PLAN NOTE
While completing annual wellness visit patient complains that she still is having some pain with urination.  She states that it does not hurt to pee however the skin around her labia's are painful and irritated. She has already been treated for UTI states those symptoms improved. Labs reviewed from [facility] at [facility] show elevated specific gravity, turbid appearance, yeast.  We will go ahead and provide patient with Monistat suppositories for 7 days as well as nystatin patient will follow-up in 1 week

## 2022-04-27 NOTE — TELEPHONE ENCOUNTER
Pt called stating she's able to get medication today per pharmacy, she will call back if she encounters any problem getting medication.

## 2022-04-29 ENCOUNTER — TELEPHONE (OUTPATIENT)
Dept: MEDICAL GROUP | Facility: PHYSICIAN GROUP | Age: 82
End: 2022-04-29
Payer: MEDICARE

## 2022-04-29 DIAGNOSIS — B37.31 YEAST VAGINITIS: ICD-10-CM

## 2022-04-29 RX ORDER — FLUCONAZOLE 100 MG/1
100 TABLET ORAL DAILY
Qty: 1 TABLET | Refills: 0 | Status: SHIPPED | OUTPATIENT
Start: 2022-04-29 | End: 2022-05-05 | Stop reason: ALTCHOICE

## 2022-04-29 NOTE — TELEPHONE ENCOUNTER
Phone Number Called: [phone]    Call outcome: Spoke to patient regarding message below.    Message: LABS STABLE, pt should contact [facility] for prolia administration. Per pt, has appointment with them already scheduled, will call to confirm appt.       Pt notified RN that suppositories that Stacy prescribed, she cannot administer d/t stroke pt had previously. Pt reports having attempted to self-administer suppository numerous times, unable. Pt does not have anyone who can help with administration. Pt requesting different medication, Diflucan. Message sent to PCP

## 2022-05-05 ENCOUNTER — NON-PROVIDER VISIT (OUTPATIENT)
Dept: MEDICAL GROUP | Facility: PHYSICIAN GROUP | Age: 82
End: 2022-05-05
Payer: MEDICARE

## 2022-05-05 ENCOUNTER — ANTICOAGULATION MONITORING (OUTPATIENT)
Dept: MEDICAL GROUP | Facility: PHYSICIAN GROUP | Age: 82
End: 2022-05-05

## 2022-05-05 PROCEDURE — 99211 OFF/OP EST MAY X REQ PHY/QHP: CPT | Performed by: STUDENT IN AN ORGANIZED HEALTH CARE EDUCATION/TRAINING PROGRAM

## 2022-05-05 RX ORDER — FLUCONAZOLE 150 MG/1
TABLET ORAL
Qty: 2 TABLET | Refills: 1 | Status: SHIPPED | OUTPATIENT
Start: 2022-05-05

## 2022-05-05 NOTE — PATIENT INSTRUCTIONS
Shireen should qualify for medicaid to pay for all her medications  Please apply [email or web address]

## 2022-05-05 NOTE — PROGRESS NOTES
Patient Consult Note    TIME IN: 10:00  TIME OUT: 10:32    Primary care physician: CARMELO Herbert    Reason for consult: Management of Controlled Type 2 Diabetes    HPI:  Shireen Astorga is a 82 y.o. old patient who comes in today for evaluation of above stated problem.    Most Recent HbA1c and POCT glucose:   Lab Results   Component Value Date/Time    HBA1C 6.3 (A) 12/16/2021 12:00 AM            Most Recent Scr:  Lab Results   Component Value Date/Time    CREATININE 0.69 01/04/2022 11:12 AM        Diabetes Medication History and Current   SGLT-2 Inhibitor: Jardiance 25mg                Current Exercise - pt is wheelchair bound  Exercise Goal - pt would benefit from daily dedicated walking    Dietary - pt is limited by [place] at the lodge    Foot Exam:  Monofilament exam - current  Monofilament testing with a 10 gram force: sensation intact: decreased bilaterally.    Visual Inspection: Feet without maceration, ulcers, fissures.  Feet dry.  Pedal pulses: intact bilaterally    Preventative Management  BP regimen (ACE/ARB) - none  ASA - 81mg   Statin - atorva 10  Last Retinal Scan - current  Last Foot Exam -  current  Last A1c -   Lab Results   Component Value Date/Time    HBA1C 6.3 (A) 12/16/2021 12:00 AM      Last Microalbuminuria - current     updated caregaps    Past Medical History:  Patient Active Problem List    Diagnosis Date Noted   • Yeast vaginitis 04/27/2022   • Contracture of hand 04/26/2022   • Vertigo 04/26/2022   • Dysuria 04/14/2022   • Fingertip amputation 02/16/2022   • Type 2 diabetes mellitus with other specified complication ([facility]) 01/25/2022   • 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) 01/25/2022   • 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 ([facility]) 01/25/2022   • Gastroesophageal reflux disease 01/04/2022   • Functional urinary incontinence 01/04/2022   • BMI 25.0-25.9,adult 12/21/2021   • Phantom pain ([facility]) 12/21/2021   • Hyperlipidemia due to type 2 diabetes  mellitus ([facility]) 2021   • Hemiplegia as late effect of cerebrovascular accident (CVA) ([facility]) 2021   • Diabetic retinopathy of both eyes associated with type 2 diabetes mellitus ([facility]) 2021   • Age-related osteoporosis without current pathological fracture 10/25/2012       Past Surgical History:  Past Surgical History:   Procedure Laterality Date   • ABDOMINAL HYSTERECTOMY TOTAL      bilateral salpingo-oophorectomy in 89 and 89   • HIP ARTHROPLASTY MIS TOTAL     • KNEE AMPUTATION BELOW Bilateral        Allergies:  Cefdinir, Codeine, Lactose, Pcn [penicillins], Sulfa drugs, Azithromycin, Gluten meal, and Piperacillin sod-tazobactam so    Social History:  Social History     Socioeconomic History   • Marital status: Unknown     Spouse name: Not on file   • Number of children: Not on file   • Years of education: Not on file   • Highest education level: Not on file   Occupational History   • Not on file   Tobacco Use   • Smoking status: Former Smoker     Quit date: 1988     Years since quittin.3   • Smokeless tobacco: Never Used   Vaping Use   • Vaping Use: Never used   Substance and Sexual Activity   • Alcohol use: No   • Drug use: Not on file   • Sexual activity: Not on file   Other Topics Concern   • Not on file   Social History Narrative   • Not on file     Social Determinants of Health     Financial Resource Strain: Not on file   Food Insecurity: Not on file   Transportation Needs: Not on file   Physical Activity: Not on file   Stress: Not on file   Social Connections: Not on file   Intimate Partner Violence: Not on file   Housing Stability: Not on file       Family History:  Family History   Problem Relation Age of Onset   • Heart Failure Mother    • Heart Disease Father        Medications:    Current Outpatient Medications:   •  fluconazole (DIFLUCAN) 150 MG tablet, Take one tablet today, repeat in 3 days if not resolved., Disp: 2 Tablet, Rfl: 1  •  denosumab (PROLIA) 60 MG/ML Solution  Prefilled Syringe injection, Inject 1 mL under the skin every 6 months., Disp: 1 mL, Rfl: 0  •  nystatin (MYCOSTATIN) powder, Apply 1 g topically 2 times a day., Disp: 15 g, Rfl: 0  •  gabapentin (NEURONTIN) 600 MG tablet, Take 1 Tablet by mouth 2 times a day., Disp: 180 Tablet, Rfl: 0  •  miconazole (MONISTAT-7) 100 MG Suppos, Insert 1 Suppository into the vagina every evening., Disp: 7 Suppository, Rfl: 0  •  brimonidine (ALPHAGAN) 0.2 % Solution, INSTILL 1 DROP INTO BOTH EYES TWICE A DAY, Disp: , Rfl:   •  Empagliflozin (JARDIANCE) 25 MG Tab, Take 1 Tablet by mouth every day., Disp: , Rfl:   •  calcium citrate (CALCITRATE) 950 (200 Ca) MG Tab, Take 950 mg by mouth., Disp: , Rfl:   •  vitamin D (VITAMIND D3) 1000 UNIT Tab, Take 1,000 Units by mouth every day., Disp: , Rfl:   •  omeprazole (PRILOSEC) 20 MG delayed-release capsule, Take 20 mg by mouth every day., Disp: , Rfl:   •  atorvastatin (LIPITOR) 10 MG Tab, Take 1 Tablet by mouth every day., Disp: 90 Tablet, Rfl: 2  •  acetaminophen (TYLENOL) 500 MG Tab, , Disp: , Rfl:   •  aspirin (ASA) 81 MG Chew Tab chewable tablet, 2 tab(s) orally once a day, Disp: , Rfl:   •  Biotin 10 MG Tab, Take 1 Tablet by mouth every day., Disp: , Rfl:   •  Coenzyme Q10 200 MG Cap, Take 1 Capsule by mouth every day., Disp: , Rfl:   •  meclizine (ANTIVERT) 25 MG Tab, Take 25 mg by mouth., Disp: , Rfl:   •  Cyanocobalamin (B-12) 500 MCG Tab, Take  by mouth., Disp: , Rfl:   •  Calcium Carbonate-Vitamin D 600-200 MG-UNIT Tab, Take  by mouth., Disp: , Rfl:     Labs: Reviewed    Physical Examination:  Vital signs: LMP 10/28/1989  There is no height or weight on file to calculate BMI.    Assessment and Plan:    1. DM2  • Pt tolerating Jardiance 25mg, although c/o genital yeast infection.  Diflucan ordered  • Pt still has not applied for medicaid.  Pt does not qualify for PAP as income is TOO LOW  • Jardiance samples provided  • Pt is frustrated that her daughter has not yet applied her for  medicaid    - Medication changes:  • none     - Lifestyle changes:  • Continue current regimen    Follow Up:  4 weeks    Mary Cisneros    CC:   SHARON Herbert.

## 2022-05-20 ENCOUNTER — TELEPHONE (OUTPATIENT)
Dept: VASCULAR LAB | Facility: MEDICAL CENTER | Age: 82
End: 2022-05-20
Payer: MEDICARE

## 2022-05-23 DIAGNOSIS — E78.5 HYPERLIPIDEMIA DUE TO TYPE 2 DIABETES MELLITUS (HCC): ICD-10-CM

## 2022-05-23 DIAGNOSIS — E11.69 HYPERLIPIDEMIA DUE TO TYPE 2 DIABETES MELLITUS (HCC): ICD-10-CM

## 2022-05-24 RX ORDER — ATORVASTATIN CALCIUM 10 MG/1
10 TABLET, FILM COATED ORAL DAILY
Qty: 100 TABLET | Refills: 2 | Status: SHIPPED | OUTPATIENT
Start: 2022-05-24 | End: 2022-08-31

## 2022-06-09 ENCOUNTER — ANTICOAGULATION MONITORING (OUTPATIENT)
Dept: MEDICAL GROUP | Facility: PHYSICIAN GROUP | Age: 82
End: 2022-06-09

## 2022-06-09 ENCOUNTER — NON-PROVIDER VISIT (OUTPATIENT)
Dept: MEDICAL GROUP | Facility: PHYSICIAN GROUP | Age: 82
End: 2022-06-09
Payer: MEDICARE

## 2022-06-09 PROCEDURE — 99211 OFF/OP EST MAY X REQ PHY/QHP: CPT | Performed by: STUDENT IN AN ORGANIZED HEALTH CARE EDUCATION/TRAINING PROGRAM

## 2022-06-09 NOTE — PROGRESS NOTES
Patient Consult Note    TIME IN: 9:48  TIME OUT: 10:22    Primary care physician: CARMELO Herbert    Reason for consult: Management of Uncontrolled Type 2 Diabetes    HPI:  Shireen Astorga is a 82 y.o. old patient who comes in today for evaluation of above stated problem.    Most Recent HbA1c and POCT glucose:   Lab Results   Component Value Date/Time    HBA1C 6.3 (A) 12/16/2021 12:00 AM            Most Recent Scr:  Lab Results   Component Value Date/Time    CREATININE 0.69 01/04/2022 11:12 AM        Diabetes Medication History and Current Regimen  Jardiance 25mg po daily      Hypoglycemia awareness - yes  Nocturnal hypoglycemia- none  Hypoglycemia:  None    Pt's treatment of Hypoglycemia - n/a  - 15:15 Rule    Current Exercise -none - wheelchair bound  Exercise Goal - 30 minutes of daily dedicated walking    Dietary - whatever they serve at the lodge      Foot Exam:  Monofilament exam - current  Monofilament testing with a 10 gram force: sensation intact: decreased bilaterally.    Visual Inspection: Feet without maceration, ulcers, fissures.  Feet dry.  Pedal pulses: intact bilaterally    Preventative Management  BP regimen (ACE/ARB) - current  ASA - 81mg po daily  Statin - atorva 10  Last Retinal Scan - overdue  Last Foot Exam - current  Last A1c -   Lab Results   Component Value Date/Time    HBA1C 6.3 (A) 12/16/2021 12:00 AM      Last Microalbuminuria -       updated caregaps    Past Medical History:  Patient Active Problem List    Diagnosis Date Noted   • Yeast vaginitis 04/27/2022   • Contracture of hand 04/26/2022   • Vertigo 04/26/2022   • Dysuria 04/14/2022   • Fingertip amputation 02/16/2022   • Type 2 diabetes mellitus with other specified complication (HCC) 01/25/2022   • 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) 2021   • Hyperlipidemia due to type 2 diabetes mellitus ([facility]) 2021   • Hemiplegia as late effect of cerebrovascular accident (CVA) ([facility]) 2021   • Diabetic retinopathy of both eyes associated with type 2 diabetes mellitus ([facility]) 2021   • Age-related osteoporosis without current pathological fracture 10/25/2012       Past Surgical History:  Past Surgical History:   Procedure Laterality Date   • ABDOMINAL HYSTERECTOMY TOTAL      bilateral salpingo-oophorectomy in  and    • HIP ARTHROPLASTY MIS TOTAL     • KNEE AMPUTATION BELOW Bilateral        Allergies:  Cefdinir, Codeine, Lactose, Pcn [penicillins], Sulfa drugs, Azithromycin, Gluten meal, and Piperacillin sod-tazobactam so    Social History:  Social History     Socioeconomic History   • Marital status: Unknown     Spouse name: Not on file   • Number of children: Not on file   • Years of education: Not on file   • Highest education level: Not on file   Occupational History   • Not on file   Tobacco Use   • Smoking status: Former Smoker     Quit date: 1988     Years since quittin.4   • Smokeless tobacco: Never Used   Vaping Use   • Vaping Use: Never used   Substance and Sexual Activity   • Alcohol use: No   • Drug use: Not on file   • Sexual activity: Not on file   Other Topics Concern   • Not on file   Social History Narrative   • Not on file     Social Determinants of Health     Financial Resource Strain: Not on file   Food Insecurity: Not on file   Transportation Needs: Not on file   Physical Activity: Not on file   Stress: Not on file   Social Connections: Not on file   Intimate Partner Violence: Not on file   Housing Stability: Not on file       Family History:  Family History   Problem Relation Age of Onset   • Heart Failure Mother    • Heart Disease Father        Medications:    Current Outpatient Medications:   •  atorvastatin (LIPITOR) 10 MG Tab, Take 1 Tablet by  mouth every day., Disp: 100 Tablet, Rfl: 2  •  fluconazole (DIFLUCAN) 150 MG tablet, Take one tablet today, repeat in 3 days if not resolved., Disp: 2 Tablet, Rfl: 1  •  denosumab (PROLIA) 60 MG/ML Solution Prefilled Syringe injection, Inject 1 mL under the skin every 6 months., Disp: 1 mL, Rfl: 0  •  nystatin (MYCOSTATIN) powder, Apply 1 g topically 2 times a day., Disp: 15 g, Rfl: 0  •  gabapentin (NEURONTIN) 600 MG tablet, Take 1 Tablet by mouth 2 times a day., Disp: 180 Tablet, Rfl: 0  •  miconazole (MONISTAT-7) 100 MG Suppos, Insert 1 Suppository into the vagina every evening., Disp: 7 Suppository, Rfl: 0  •  brimonidine (ALPHAGAN) 0.2 % Solution, INSTILL 1 DROP INTO BOTH EYES TWICE A DAY, Disp: , Rfl:   •  Empagliflozin (JARDIANCE) 25 MG Tab, Take 1 Tablet by mouth every day., Disp: , Rfl:   •  calcium citrate (CALCITRATE) 950 (200 Ca) MG Tab, Take 950 mg by mouth., Disp: , Rfl:   •  vitamin D (VITAMIND D3) 1000 UNIT Tab, Take 1,000 Units by mouth every day., Disp: , Rfl:   •  omeprazole (PRILOSEC) 20 MG delayed-release capsule, Take 20 mg by mouth every day., Disp: , Rfl:   •  acetaminophen (TYLENOL) 500 MG Tab, , Disp: , Rfl:   •  aspirin (ASA) 81 MG Chew Tab chewable tablet, 2 tab(s) orally once a day, Disp: , Rfl:   •  Biotin 10 MG Tab, Take 1 Tablet by mouth every day., Disp: , Rfl:   •  Coenzyme Q10 200 MG Cap, Take 1 Capsule by mouth every day., Disp: , Rfl:   •  meclizine (ANTIVERT) 25 MG Tab, Take 25 mg by mouth., Disp: , Rfl:   •  Cyanocobalamin (B-12) 500 MCG Tab, Take  by mouth., Disp: , Rfl:   •  Calcium Carbonate-Vitamin D 600-200 MG-UNIT Tab, Take  by mouth., Disp: , Rfl:     Labs: Reviewed    Physical Examination:  Vital signs: [facility] 10/28/1989  There is no height or weight on file to calculate BMI.    Assessment and Plan:    1. DM2  • Pt tolerating jardiance 25mg po daily and A1c at goal  • Pt has subsequently applied for medicaid - and was denied  • Resubmitted [facility] application with denial  statement  • Samples provided    - Medication changes:  • none     - Lifestyle changes:  • Continue present management    Follow Up:  4 weeks    Mary Cisneros    CC:   CARMELO Herbert

## 2022-06-09 NOTE — PROGRESS NOTES
Jardiance samples provided  [employer] application resumbitted with medicaid denial statement  Mary Cisneros, Clinical Pharmacist, CDE, CACP

## 2022-07-01 DIAGNOSIS — G54.6 PHANTOM PAIN: ICD-10-CM

## 2022-07-01 RX ORDER — GABAPENTIN 600 MG/1
600 TABLET ORAL 2 TIMES DAILY
Qty: 180 TABLET | Refills: 0 | Status: SHIPPED | OUTPATIENT
Start: 2022-07-01

## 2022-07-01 NOTE — TELEPHONE ENCOUNTER
Received request via: Patient    Was the patient seen in the last year in this department? Yes    Does the patient have an active prescription (recently filled or refills available) for medication(s) requested? No     Patient called office in regards to needing a refill on her medication. Please advise.

## 2022-07-07 ENCOUNTER — NON-PROVIDER VISIT (OUTPATIENT)
Dept: MEDICAL GROUP | Facility: PHYSICIAN GROUP | Age: 82
End: 2022-07-07
Payer: MEDICARE

## 2022-07-07 ENCOUNTER — ANTICOAGULATION MONITORING (OUTPATIENT)
Dept: MEDICAL GROUP | Facility: PHYSICIAN GROUP | Age: 82
End: 2022-07-07

## 2022-07-07 DIAGNOSIS — E11.69 TYPE 2 DIABETES MELLITUS WITH OTHER SPECIFIED COMPLICATION, WITHOUT LONG-TERM CURRENT USE OF INSULIN (HCC): ICD-10-CM

## 2022-07-07 LAB — GLUCOSE BLD-MCNC: 192 MG/DL (ref 70–100)

## 2022-07-07 PROCEDURE — 99999 POCT GLUCOSE: CPT | Performed by: STUDENT IN AN ORGANIZED HEALTH CARE EDUCATION/TRAINING PROGRAM

## 2022-07-07 PROCEDURE — 82962 GLUCOSE BLOOD TEST: CPT | Performed by: STUDENT IN AN ORGANIZED HEALTH CARE EDUCATION/TRAINING PROGRAM

## 2022-07-07 PROCEDURE — 99211 OFF/OP EST MAY X REQ PHY/QHP: CPT | Performed by: STUDENT IN AN ORGANIZED HEALTH CARE EDUCATION/TRAINING PROGRAM

## 2022-07-22 ENCOUNTER — TELEPHONE (OUTPATIENT)
Dept: MEDICAL GROUP | Facility: PHYSICIAN GROUP | Age: 82
End: 2022-07-22
Payer: MEDICARE

## 2022-07-22 NOTE — TELEPHONE ENCOUNTER
Patient called office in regards to having a urinary tract infection. Patient states she needs antibiotics. She also stated that she does not want any sulfa drugs. Patient states the only symptoms she is having is burning and urgency. She states she has been having symptoms for 3-4 days. Please advise.

## 2022-07-22 NOTE — TELEPHONE ENCOUNTER
Left voicemail for patient to inform her that I have passed the message over to leonardo and that leonardo would like her to schedule an appointment to discuss treatments for her UTI. I informed patient to call scheduling at [phone] to schedule an appointment.

## 2022-07-26 ENCOUNTER — OFFICE VISIT (OUTPATIENT)
Dept: MEDICAL GROUP | Facility: PHYSICIAN GROUP | Age: 82
End: 2022-07-26
Payer: MEDICARE

## 2022-07-26 ENCOUNTER — HOSPITAL ENCOUNTER (OUTPATIENT)
Facility: MEDICAL CENTER | Age: 82
End: 2022-07-26
Attending: NURSE PRACTITIONER
Payer: MEDICARE

## 2022-07-26 VITALS
TEMPERATURE: 97.7 F | DIASTOLIC BLOOD PRESSURE: 60 MMHG | HEART RATE: 69 BPM | OXYGEN SATURATION: 95 % | RESPIRATION RATE: 12 BRPM | WEIGHT: 160 LBS | BODY MASS INDEX: 25.11 KG/M2 | HEIGHT: 67 IN | SYSTOLIC BLOOD PRESSURE: 130 MMHG

## 2022-07-26 DIAGNOSIS — N39.0 RECURRENT UTI: ICD-10-CM

## 2022-07-26 DIAGNOSIS — R31.29 MICROSCOPIC HEMATURIA: ICD-10-CM

## 2022-07-26 DIAGNOSIS — R30.0 DYSURIA: ICD-10-CM

## 2022-07-26 DIAGNOSIS — Z87.440 HISTORY OF RECURRENT UTI (URINARY TRACT INFECTION): ICD-10-CM

## 2022-07-26 PROBLEM — B37.31 YEAST VAGINITIS: Status: RESOLVED | Noted: 2022-04-27 | Resolved: 2022-07-26

## 2022-07-26 LAB
APPEARANCE UR: NORMAL
BILIRUB UR STRIP-MCNC: NEGATIVE MG/DL
COLOR UR AUTO: NORMAL
GLUCOSE UR STRIP.AUTO-MCNC: NORMAL MG/DL
KETONES UR STRIP.AUTO-MCNC: NORMAL MG/DL
LEUKOCYTE ESTERASE UR QL STRIP.AUTO: NORMAL
NITRITE UR QL STRIP.AUTO: POSITIVE
PH UR STRIP.AUTO: 5 [PH] (ref 5–8)
PROT UR QL STRIP: NORMAL MG/DL
RBC UR QL AUTO: NORMAL
SP GR UR STRIP.AUTO: 1.02
UROBILINOGEN UR STRIP-MCNC: NORMAL MG/DL

## 2022-07-26 PROCEDURE — 81002 URINALYSIS NONAUTO W/O SCOPE: CPT | Performed by: NURSE PRACTITIONER

## 2022-07-26 PROCEDURE — 87086 URINE CULTURE/COLONY COUNT: CPT

## 2022-07-26 PROCEDURE — 99214 OFFICE O/P EST MOD 30 MIN: CPT | Performed by: NURSE PRACTITIONER

## 2022-07-26 PROCEDURE — 87186 SC STD MICRODIL/AGAR DIL: CPT

## 2022-07-26 RX ORDER — CHLORHEXIDINE GLUCONATE ORAL RINSE 1.2 MG/ML
SOLUTION DENTAL
COMMUNITY
Start: 2022-06-13

## 2022-07-26 RX ORDER — NITROFURANTOIN 25; 75 MG/1; MG/1
100 CAPSULE ORAL 2 TIMES DAILY
Qty: 10 CAPSULE | Refills: 0 | Status: SHIPPED | OUTPATIENT
Start: 2022-07-26 | End: 2022-08-11

## 2022-07-26 ASSESSMENT — FIBROSIS 4 INDEX: FIB4 SCORE: 1.88

## 2022-07-26 NOTE — PROGRESS NOTES
"  Chief Complaint   Patient presents with   • UTI     .pro  HPI:  Symptom onset: 10 days ago   Current symptoms: Painful.  Due to previous history of CVA patient is unable to recognize when she needs to urinate, she notices when her bladder is distended. . No blood noted in urine.  Since onset symptoms are: Unchanged  Treatments tried: OTC antispasm med.  Associated symptoms: Negative for fever, flank pain, nausea and vomiting, vaginal discharge, pelvic pain.  History is positive for frequent UTI.       ROS:  Denies fever, chills, vomiting or abdominal pain.     OBJECTIVE:  /60 (BP Location: Right arm, Patient Position: Sitting, BP Cuff Size: Adult)   Pulse 69   Temp 36.5 °C (97.7 °F) (Temporal)   Resp 12   Ht 1.702 m (5' 7\")   Wt 72.6 kg (160 lb)   SpO2 95%   Gen: Alert, NAD.  Chest: Lungs clear to auscultation, CV RRR.  Abdomen: Soft, tender in suprapubic region. No CVAT. Normal bowel sounds.     Lab Results   Component Value Date    POCCOLOR Dark Yellow 07/26/2022    POCAPPEAR Cloudy 07/26/2022    POCLEUKEST Small 07/26/2022    POCNITRITE Positive 07/26/2022    POCUROBILIGE 0.2 E.U./dL 07/26/2022    POCPROTEIN 30 mg/dL 07/26/2022    POCURPH 5.0 07/26/2022    POCBLOOD Moderate 07/26/2022    POCSPGRV 1.020 07/26/2022    POCKETONES Trace 07/26/2022    POCBILIRUBIN Negative 07/26/2022    POCGLUCUA 500 mg/dL 07/26/2022          ASSESSMENT/PLAN:     1. Recurrent UTI    2. Dysuria    3. History of recurrent UTI (urinary tract infection)    4. Microscopic hematuria       1. Recurrent UTI  - nitrofurantoin (MACROBID) 100 MG Cap; Take 1 Capsule by mouth 2 times a day.  Dispense: 10 Capsule; Refill: 0  - POCT Urinalysis  - URINE CULTURE(NEW); Future  2. Dysuria  3. History of recurrent UTI (urinary tract infection)  4. Microscopic hematuria  Creatinine clearance completed 73     1. Abnormal urine dipstick in office. Urine sent for culture. Start antibiotics.  2. Provided education to drink plenty of fluids, wipe " front to back every void and bowel movement.   3. Return to clinic if symptoms not improving within 3-4 days or in case of vomiting, fever, increasing pain.

## 2022-07-26 NOTE — ASSESSMENT & PLAN NOTE
Chronic and exacerbated condition   States onset 10 days ago   Tried azo with no change   creatinein clear cne calcuated at 73

## 2022-07-29 ENCOUNTER — TELEPHONE (OUTPATIENT)
Dept: MEDICAL GROUP | Facility: PHYSICIAN GROUP | Age: 82
End: 2022-07-29
Payer: MEDICARE

## 2022-07-29 NOTE — TELEPHONE ENCOUNTER
Left voicemail for patient to inform her that her calcium was stable and okay to continue with prolia injections

## 2022-07-29 NOTE — TELEPHONE ENCOUNTER
----- Message from RAINE HerbertPYvonneRLANDEN sent at 7/28/2022 12:11 AM PDT -----  Calcium stable ok to continue with prolia injections

## 2022-07-30 LAB
BACTERIA UR CULT: ABNORMAL
BACTERIA UR CULT: ABNORMAL
SIGNIFICANT IND 70042: ABNORMAL
SITE SITE: ABNORMAL
SOURCE SOURCE: ABNORMAL

## 2022-08-01 ENCOUNTER — TELEPHONE (OUTPATIENT)
Dept: MEDICAL GROUP | Facility: MEDICAL CENTER | Age: 82
End: 2022-08-01
Payer: MEDICARE

## 2022-08-01 NOTE — TELEPHONE ENCOUNTER
Trying to call patient for Medication review for [facility] plus.     4th Attempt(s) made to contact patient.   Last voice mail left on  8/1/22    Awaiting patient call back.     Jonas Mcclure, Pharmacy Intern

## 2022-08-02 ENCOUNTER — TELEPHONE (OUTPATIENT)
Dept: MEDICAL GROUP | Facility: PHYSICIAN GROUP | Age: 82
End: 2022-08-02
Payer: MEDICARE

## 2022-08-02 NOTE — TELEPHONE ENCOUNTER
Patient called stating still having problems with bladder infection after finishing with antibiotics. She wants to know what else can be done, Please advise.

## 2022-08-03 ENCOUNTER — TELEPHONE (OUTPATIENT)
Dept: MEDICAL GROUP | Facility: PHYSICIAN GROUP | Age: 82
End: 2022-08-03
Payer: MEDICARE

## 2022-08-04 ENCOUNTER — NON-PROVIDER VISIT (OUTPATIENT)
Dept: MEDICAL GROUP | Facility: PHYSICIAN GROUP | Age: 82
End: 2022-08-04

## 2022-08-04 ENCOUNTER — OFFICE VISIT (OUTPATIENT)
Dept: MEDICAL GROUP | Facility: PHYSICIAN GROUP | Age: 82
End: 2022-08-04

## 2022-08-04 ENCOUNTER — ANTICOAGULATION MONITORING (OUTPATIENT)
Dept: MEDICAL GROUP | Facility: PHYSICIAN GROUP | Age: 82
End: 2022-08-04

## 2022-08-04 VITALS
DIASTOLIC BLOOD PRESSURE: 68 MMHG | WEIGHT: 160 LBS | SYSTOLIC BLOOD PRESSURE: 128 MMHG | OXYGEN SATURATION: 98 % | HEIGHT: 67 IN | TEMPERATURE: 98.6 F | BODY MASS INDEX: 25.11 KG/M2 | RESPIRATION RATE: 12 BRPM | HEART RATE: 67 BPM

## 2022-08-04 DIAGNOSIS — R30.0 DYSURIA: ICD-10-CM

## 2022-08-04 DIAGNOSIS — B37.49 CANDIDA INFECTION OF GENITAL REGION: ICD-10-CM

## 2022-08-04 DIAGNOSIS — E11.69 TYPE 2 DIABETES MELLITUS WITH OTHER SPECIFIED COMPLICATION, WITHOUT LONG-TERM CURRENT USE OF INSULIN (HCC): ICD-10-CM

## 2022-08-04 LAB
GLUCOSE BLD-MCNC: 124 MG/DL (ref 70–100)
HBA1C MFR BLD: 6.8 % (ref 0–5.6)
INT CON NEG: ABNORMAL
INT CON POS: ABNORMAL

## 2022-08-04 PROCEDURE — 99214 OFFICE O/P EST MOD 30 MIN: CPT | Performed by: NURSE PRACTITIONER

## 2022-08-04 PROCEDURE — 82962 GLUCOSE BLOOD TEST: CPT | Performed by: NURSE PRACTITIONER

## 2022-08-04 PROCEDURE — 83036 HEMOGLOBIN GLYCOSYLATED A1C: CPT | Performed by: NURSE PRACTITIONER

## 2022-08-04 PROCEDURE — 99999 POCT GLUCOSE: CPT | Performed by: NURSE PRACTITIONER

## 2022-08-04 PROCEDURE — 99999 PR NO CHARGE: CPT | Performed by: NURSE PRACTITIONER

## 2022-08-04 RX ORDER — NYSTATIN 100000 U/G
1 CREAM TOPICAL 2 TIMES DAILY
Qty: 15 G | Refills: 0 | Status: SHIPPED | OUTPATIENT
Start: 2022-08-04

## 2022-08-04 ASSESSMENT — FIBROSIS 4 INDEX: FIB4 SCORE: 1.88

## 2022-08-04 NOTE — PROGRESS NOTES
Patient Consult Note    TIME IN: 9:32  TIME OUT: 10:00    Primary care physician: CARMELO Herbert    Reason for consult: Management of Controlled Type 2 Diabetes    HPI:  Shireen Astorga is a 82 y.o. old patient who comes in today for evaluation of above stated problem.    Most Recent HbA1c and POCT glucose:   Lab Results   Component Value Date/Time    HBA1C 6.8 (A) 08/04/2022 09:50 AM      Recent Labs     08/04/22  0950   POCGLUCOSE 124*       Most Recent Scr:  Lab Results   Component Value Date/Time    CREATININE 0.69 01/04/2022 11:12 AM        Current Diabetes Medication Regimen  Metformin: IR/ER not tolerated   Jardiance 25mg po daily        Pt has home glucometer and proper testing technique - pt does not test, left michelle          Current Exercise - none pt is wheelchair bound, left michelle       Dietary - common adult [facility] the [facility]      Foot Exam:  Monofilament exam - current  Monofilament testing with a 10 gram force: sensation intact: decreased bilaterally.    Visual Inspection: Feet without maceration, ulcers, fissures.  Feet dry.  Pedal pulses: intact bilaterally    Preventative Management  BP regimen (ACE/ARB) - none  ASA - none  Statin - atorva 10  Last Retinal Scan - current  Last Foot Exam - current  Last A1c -   Lab Results   Component Value Date/Time    HBA1C 6.8 (A) 08/04/2022 09:50 AM      Last Microalbuminuria -       updated caregaps    Past Medical History:  Patient Active Problem List    Diagnosis Date Noted   • Recurrent UTI 07/26/2022   • Contracture of hand 04/26/2022   • Vertigo 04/26/2022   • Dysuria 04/14/2022   • Fingertip amputation 02/16/2022   • Type 2 diabetes mellitus with other specified complication (HCC) 01/25/2022   • 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) 01/25/2022   • 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 (HCC) 01/25/2022   • Gastroesophageal reflux disease 01/04/2022   • Functional urinary incontinence 01/04/2022   • BMI 25.0-25.9,adult  2021   • Phantom pain ([facility]) 2021   • Hyperlipidemia due to type 2 diabetes mellitus ([facility]) 2021   • Hemiplegia as late effect of cerebrovascular accident (CVA) ([facility]) 2021   • Diabetic retinopathy of both eyes associated with type 2 diabetes mellitus ([facility]) 2021   • Age-related osteoporosis without current pathological fracture 10/25/2012       Past Surgical History:  Past Surgical History:   Procedure Laterality Date   • ABDOMINAL HYSTERECTOMY TOTAL      bilateral salpingo-oophorectomy in  and    • HIP ARTHROPLASTY MIS TOTAL     • KNEE AMPUTATION BELOW Bilateral        Allergies:  Cefdinir, Codeine, Lactose, Pcn [penicillins], Sulfa drugs, Azithromycin, Gluten meal, and Piperacillin sod-tazobactam so    Social History:  Social History     Socioeconomic History   • Marital status: Unknown     Spouse name: Not on file   • Number of children: Not on file   • Years of education: Not on file   • Highest education level: Not on file   Occupational History   • Not on file   Tobacco Use   • Smoking status: Former Smoker     Quit date: 1988     Years since quittin.6   • Smokeless tobacco: Never Used   Vaping Use   • Vaping Use: Never used   Substance and Sexual Activity   • Alcohol use: No   • Drug use: Not on file   • Sexual activity: Not on file   Other Topics Concern   • Not on file   Social History Narrative   • Not on file     Social Determinants of Health     Financial Resource Strain: Not on file   Food Insecurity: Not on file   Transportation Needs: Not on file   Physical Activity: Not on file   Stress: Not on file   Social Connections: Not on file   Intimate Partner Violence: Not on file   Housing Stability: Not on file       Family History:  Family History   Problem Relation Age of Onset   • Heart Failure Mother    • Heart Disease Father        Medications:    Current Outpatient Medications:   •  gabapentin (NEURONTIN) 600 MG tablet, Take 1 Tablet by mouth 2 times a day.,  Disp: 180 Tablet, Rfl: 0  •  chlorhexidine (PERIDEX) 0.12 % Solution, RINSE WITH 15 MLS BY MOUTH FOR 30 SECONDS TWICE DAILY FOR ONE WEEK EACH MONTH AVOID FOOD AND DRINK FOR 30 MINUTES AFTER USE, Disp: , Rfl:   •  nitrofurantoin (MACROBID) 100 MG Cap, Take 1 Capsule by mouth 2 times a day., Disp: 10 Capsule, Rfl: 0  •  atorvastatin (LIPITOR) 10 MG Tab, Take 1 Tablet by mouth every day., Disp: 100 Tablet, Rfl: 2  •  fluconazole (DIFLUCAN) 150 MG tablet, Take one tablet today, repeat in 3 days if not resolved., Disp: 2 Tablet, Rfl: 1  •  denosumab (PROLIA) 60 MG/ML Solution Prefilled Syringe injection, Inject 1 mL under the skin every 6 months., Disp: 1 mL, Rfl: 0  •  nystatin (MYCOSTATIN) powder, Apply 1 g topically 2 times a day., Disp: 15 g, Rfl: 0  •  miconazole (MONISTAT-7) 100 MG Suppos, Insert 1 Suppository into the vagina every evening., Disp: 7 Suppository, Rfl: 0  •  brimonidine (ALPHAGAN) 0.2 % Solution, INSTILL 1 DROP INTO BOTH EYES TWICE A DAY, Disp: , Rfl:   •  Empagliflozin (JARDIANCE) 25 MG Tab, Take 1 Tablet by mouth every day., Disp: , Rfl:   •  calcium citrate (CALCITRATE) 950 (200 Ca) MG Tab, Take 950 mg by mouth., Disp: , Rfl:   •  vitamin D (VITAMIND D3) 1000 UNIT Tab, Take 1,000 Units by mouth every day., Disp: , Rfl:   •  omeprazole (PRILOSEC) 20 MG delayed-release capsule, Take 20 mg by mouth every day., Disp: , Rfl:   •  acetaminophen (TYLENOL) 500 MG Tab, , Disp: , Rfl:   •  aspirin (ASA) 81 MG Chew Tab chewable tablet, 2 tab(s) orally once a day, Disp: , Rfl:   •  Biotin 10 MG Tab, Take 1 Tablet by mouth every day., Disp: , Rfl:   •  Coenzyme Q10 200 MG Cap, Take 1 Capsule by mouth every day., Disp: , Rfl:   •  meclizine (ANTIVERT) 25 MG Tab, Take 25 mg by mouth., Disp: , Rfl:   •  Cyanocobalamin (B-12) 500 MCG Tab, Take  by mouth., Disp: , Rfl:   •  Calcium Carbonate-Vitamin D 600-200 MG-UNIT Tab, Take  by mouth., Disp: , Rfl:     Labs: Reviewed    Physical Examination:  Vital signs: LMP  10/28/1988  There is no height or weight on file to calculate BMI.    Assessment and Plan:    1. DM2  • A1c 6.8, remains at goal  • FSBG 124 in the office   • Pt doing well on Jardiance 25mg po daily, however did not qualify for PAP  • Samples provided    - Medication changes:  • none     - Lifestyle changes:  • Pt has recently stopped drinking cranberry juice, if A1c continues to improve, may try without any medication soon.    Follow Up:  4 weeks    Mary Cisneros    CC:   SHARON Herbert.

## 2022-08-04 NOTE — PROGRESS NOTES
CC:  Chief Complaint   Patient presents with   • Cystitis     Medication did not seem to work.   • Lab Results     Urine culture 7/29       HISTORY OF PRESENT ILLNESS: Patient is a 82 y.o. female established patient presenting     Dysuria  Continues to have dysuria and urgency  Improvement on flow    Candida infection of genital region  Acute uncomplicated condition.  States that she has noticed burning on her skin when she urinates  She thinks is related to her UTI  We are still pending culture results from her UTI for susceptibilities  Patient did complete her antibiotics with some noted improvement  Patient does have difficulty cleaning herself as she does have deficits from her stroke.  She notes that living the [place] it is difficult and cause a lot more money to get somebody coming in and provide you with bathing.  She often will clean herself in her genital region on the toilet.  MICHELL Grimm was able to assist with patient in room to stand patient up so we can pull brace down and evaluate genital region.  Noted to be erythematous with white discharge.  Patient denies chills, fevers, nausea, vomiting, lower pelvic pain         Allergies:Cefdinir, Codeine, Lactose, Pcn [penicillins], Sulfa drugs, Azithromycin, Gluten meal, and Piperacillin sod-tazobactam so    Current Outpatient Medications   Medication Sig Dispense Refill   • nystatin (MYCOSTATIN) 021478 UNIT/GM Cream topical cream Apply 1 g topically 2 times a day. 15 g 0   • chlorhexidine (PERIDEX) 0.12 % Solution RINSE WITH 15 MLS BY MOUTH FOR 30 SECONDS TWICE DAILY FOR ONE WEEK EACH MONTH AVOID FOOD AND DRINK FOR 30 MINUTES AFTER USE     • nitrofurantoin (MACROBID) 100 MG Cap Take 1 Capsule by mouth 2 times a day. 10 Capsule 0   • gabapentin (NEURONTIN) 600 MG tablet Take 1 Tablet by mouth 2 times a day. 180 Tablet 0   • atorvastatin (LIPITOR) 10 MG Tab Take 1 Tablet by mouth every day. 100 Tablet 2   • fluconazole (DIFLUCAN) 150 MG tablet Take one tablet  today, repeat in 3 days if not resolved. 2 Tablet 1   • denosumab (PROLIA) 60 MG/ML Solution Prefilled Syringe injection Inject 1 mL under the skin every 6 months. 1 mL 0   • nystatin (MYCOSTATIN) powder Apply 1 g topically 2 times a day. 15 g 0   • brimonidine (ALPHAGAN) 0.2 % Solution INSTILL 1 DROP INTO BOTH EYES TWICE A DAY     • Empagliflozin (JARDIANCE) 25 MG Tab Take 1 Tablet by mouth every day.     • vitamin D (VITAMIND D3) 1000 UNIT Tab Take 1,000 Units by mouth every day.     • omeprazole (PRILOSEC) 20 MG delayed-release capsule Take 20 mg by mouth every day.     • acetaminophen (TYLENOL) 500 MG Tab      • aspirin (ASA) 81 MG Chew Tab chewable tablet 2 tab(s) orally once a day     • Biotin 10 MG Tab Take 1 Tablet by mouth every day.     • Coenzyme Q10 200 MG Cap Take 1 Capsule by mouth every day.     • meclizine (ANTIVERT) 25 MG Tab Take 25 mg by mouth.     • Cyanocobalamin (B-12) 500 MCG Tab Take  by mouth.     • Calcium Carbonate-Vitamin D 600-200 MG-UNIT Tab Take  by mouth.     • miconazole (MONISTAT-7) 100 MG Suppos Insert 1 Suppository into the vagina every evening. (Patient not taking: Reported on 2022) 7 Suppository 0     No current facility-administered medications for this visit.     Past Medical History:   Diagnosis Date   • CVA (cerebral vascular accident) ([facility])    • Neuropathic peripheral nerve    • OSTEOPOROSIS    • Right knee injury     meniscus damage   • S/P BKA (below knee amputation) ([facility])    • Yeast vaginitis 2022     Past Surgical History:   Procedure Laterality Date   • ABDOMINAL HYSTERECTOMY TOTAL      bilateral salpingo-oophorectomy in  and    • HIP ARTHROPLASTY MIS TOTAL     • KNEE AMPUTATION BELOW Bilateral      Social History     Tobacco Use   • Smoking status: Former Smoker     Quit date: 1988     Years since quittin.6   • Smokeless tobacco: Never Used   Vaping Use   • Vaping Use: Never used   Substance Use Topics   • Alcohol use: No     Social History  "    Social History Narrative   • Not on file       Family History   Problem Relation Age of Onset   • Heart Failure Mother    • Heart Disease Father         ROS   See HPI      - NOTE: All other systems reviewed and are negative, except as in HPI.      Exam:    /68 (BP Location: Left arm, Patient Position: Sitting, BP Cuff Size: Adult)   Pulse 67   Temp 37 °C (98.6 °F) (Temporal)   Resp 12   Ht 1.702 m (5' 7\")   Wt 72.6 kg (160 lb)   SpO2 98%  Body mass index is 25.06 kg/m².    General: Alert, pleasant, NAD  EYES:   PERRL, EOMI, no icterus or pallor.  Conjunctivae and lids normal.   HENT:  Normocephalic.  External ears normal.   Musculoskeletal: Uses wheelchair, bilateral prosthetics  Extremities: No clubbing, cyanosis or edema noted.  Left middle finger amputation  : No vaginal discharge noted, white discharge between labia majora and labia minora noted with erythematous labia's on inner and outer groin  Neurological: No tremors, sensation grossly intact, tone/strength normal, deficits baseline noted to right upper arm   psych:  Affect/mood is normal, judgement is good, memory is intact, grooming is appropriate.    Assessment/Plan:  1. Dysuria  2. Candida infection of genital region  - nystatin (MYCOSTATIN) 235499 UNIT/GM Cream topical cream; Apply 1 g topically 2 times a day.  Dispense: 15 g; Refill: 0  I believe that patient no longer has a UTI however we are still pending cultures to verify that medication was completed appropriately.  At this time I believe what she is experienced more metopic dermatitis due to difficulty with hygiene as well as continuous use of briefs.  We will start patient on nystatin to assist with Candida in the groin region and have patient follow-up in 1 week to no improvement.    Discussed with patient possible alternative diagnoses, patient is to take all medications as prescribed.     If symptoms persist FU w/PCP, if symptoms worsen go to emergency room.     If " experiencing any side effects from prescribed medications reports to the office immediately or go to emergency room.    Reviewed indication, dosage, usage and potential adverse effects of prescribed medications.     Reviewed risks and benefits of treatment plan. Patient verbalizes understanding of all instruction and verbally agrees to plan.      Return in about 1 week (around 8/11/2022) for Follow-up 1 to 2 weeks for vaginal candidiasis.    Please note that this dictation was created using voice recognition software. I have made every reasonable attempt to correct obvious errors, but I expect that there are errors of grammar and possibly content that I did not discover before finalizing the note.

## 2022-08-04 NOTE — ASSESSMENT & PLAN NOTE
Acute uncomplicated condition.  States that she has noticed burning on her skin when she urinates  She thinks is related to her UTI  We are still pending culture results from her UTI for susceptibilities  Patient did complete her antibiotics with some noted improvement  Patient does have difficulty cleaning herself as she does have deficits from her stroke.  She notes that living the [place] it is difficult and cause a lot more money to get somebody coming in and provide you with bathing.  She often will clean herself in her genital region on the toilet.  MICHELL Grimm was able to assist with patient in room to stand patient up so we can pull brace down and evaluate genital region.  Noted to be erythematous with white discharge.  Patient denies chills, fevers, nausea, vomiting, lower pelvic pain

## 2022-08-05 ENCOUNTER — DOCUMENTATION (OUTPATIENT)
Dept: VASCULAR LAB | Facility: MEDICAL CENTER | Age: 82
End: 2022-08-05
Payer: MEDICARE

## 2022-08-05 NOTE — PROGRESS NOTES
"Patient LM asking for Mary Filter, she stated she needed to speak with her as she \"only has 27 pills\".  Returned call to patient to discuss, received VM.    LM asking for c/b.  Jamison Chopra, PharmD, BCACP    Pt called back.  Stated she found her pills, disregard the above message she has all the medications she needs.     David AgrawalD   "

## 2022-08-09 ENCOUNTER — OFFICE VISIT (OUTPATIENT)
Dept: MEDICAL GROUP | Facility: PHYSICIAN GROUP | Age: 82
End: 2022-08-09
Payer: MEDICARE

## 2022-08-09 VITALS
HEART RATE: 71 BPM | DIASTOLIC BLOOD PRESSURE: 66 MMHG | TEMPERATURE: 98.5 F | BODY MASS INDEX: 25.11 KG/M2 | SYSTOLIC BLOOD PRESSURE: 118 MMHG | HEIGHT: 67 IN | OXYGEN SATURATION: 96 % | RESPIRATION RATE: 12 BRPM | WEIGHT: 160 LBS

## 2022-08-09 DIAGNOSIS — B37.49 CANDIDA INFECTION OF GENITAL REGION: ICD-10-CM

## 2022-08-09 DIAGNOSIS — R39.81 FUNCTIONAL URINARY INCONTINENCE: ICD-10-CM

## 2022-08-09 DIAGNOSIS — R30.0 DYSURIA: ICD-10-CM

## 2022-08-09 DIAGNOSIS — Z86.73 HISTORY OF CVA (CEREBROVASCULAR ACCIDENT): ICD-10-CM

## 2022-08-09 DIAGNOSIS — N39.0 RECURRENT UTI: ICD-10-CM

## 2022-08-09 PROCEDURE — 99214 OFFICE O/P EST MOD 30 MIN: CPT | Performed by: NURSE PRACTITIONER

## 2022-08-09 ASSESSMENT — FIBROSIS 4 INDEX: FIB4 SCORE: 1.88

## 2022-08-09 NOTE — ASSESSMENT & PLAN NOTE
Acute uncomplicated condition.  Patient states that the dysuria and pain on her skin is no longer occurring however states that she started to notice more pressure on her clitoris and believes that this is due to the pressure in her bladder and a UTI.   Patient has a history of hemiplegia which causes her to not know when she can urinate until she notices that her abdomen is bloated at that time she will press on her bladder to relieve herself.  She states that sometimes some urine comes out and other times large amounts of urine come out.    Patient denies chills, fevers, nausea, vomiting, flank pain.

## 2022-08-11 RX ORDER — GRANULES FOR ORAL 3 G/1
3 POWDER ORAL ONCE
Qty: 1 EACH | Refills: 0 | Status: SHIPPED | OUTPATIENT
Start: 2022-08-11 | End: 2022-08-11

## 2022-08-12 ENCOUNTER — TELEPHONE (OUTPATIENT)
Dept: MEDICAL GROUP | Facility: PHYSICIAN GROUP | Age: 82
End: 2022-08-12
Payer: MEDICARE

## 2022-08-12 NOTE — PROGRESS NOTES
CC:  Chief Complaint   Patient presents with    Lab Results    Follow-Up       HISTORY OF PRESENT ILLNESS: Patient is a 82 y.o. female established patient presenting follow up recurrent UTI    Candida infection of genital region  Resolved with cream    Recurrent UTI  Acute uncomplicated condition.  Patient states that the dysuria and pain on her skin is no longer occurring however states that she started to notice more pressure on her clitoris and believes that this is due to the pressure in her bladder and a UTI.   Patient has a history of hemiplegia which causes her to not know when she can urinate until she notices that her abdomen is bloated at that time she will press on her bladder to relieve herself.  She states that sometimes some urine comes out and other times large amounts of urine come out.    Patient denies chills, fevers, nausea, vomiting, flank pain.       Allergies:Cefdinir, Codeine, Lactose, Pcn [penicillins], Sulfa drugs, Azithromycin, Gluten meal, and Piperacillin sod-tazobactam so    Current Outpatient Medications   Medication Sig Dispense Refill    fosfomycin (MONUROL) 3 GM Pack Take 3 g by mouth one time for 1 dose. 1 Each 0    nystatin (MYCOSTATIN) 476917 UNIT/GM Cream topical cream Apply 1 g topically 2 times a day. 15 g 0    chlorhexidine (PERIDEX) 0.12 % Solution RINSE WITH 15 MLS BY MOUTH FOR 30 SECONDS TWICE DAILY FOR ONE WEEK EACH MONTH AVOID FOOD AND DRINK FOR 30 MINUTES AFTER USE      gabapentin (NEURONTIN) 600 MG tablet Take 1 Tablet by mouth 2 times a day. 180 Tablet 0    atorvastatin (LIPITOR) 10 MG Tab Take 1 Tablet by mouth every day. 100 Tablet 2    fluconazole (DIFLUCAN) 150 MG tablet Take one tablet today, repeat in 3 days if not resolved. 2 Tablet 1    denosumab (PROLIA) 60 MG/ML Solution Prefilled Syringe injection Inject 1 mL under the skin every 6 months. 1 mL 0    nystatin (MYCOSTATIN) powder Apply 1 g topically 2 times a day. 15 g 0    miconazole (MONISTAT-7) 100 MG  Suppos Insert 1 Suppository into the vagina every evening. 7 Suppository 0    brimonidine (ALPHAGAN) 0.2 % Solution INSTILL 1 DROP INTO BOTH EYES TWICE A DAY      Empagliflozin (JARDIANCE) 25 MG Tab Take 1 Tablet by mouth every day.      vitamin D (VITAMIND D3) 1000 UNIT Tab Take 1,000 Units by mouth every day.      omeprazole (PRILOSEC) 20 MG delayed-release capsule Take 20 mg by mouth every day.      acetaminophen (TYLENOL) 500 MG Tab       aspirin (ASA) 81 MG Chew Tab chewable tablet 2 tab(s) orally once a day      Biotin 10 MG Tab Take 1 Tablet by mouth every day.      Coenzyme Q10 200 MG Cap Take 1 Capsule by mouth every day.      meclizine (ANTIVERT) 25 MG Tab Take 25 mg by mouth.      Cyanocobalamin (B-12) 500 MCG Tab Take  by mouth.      Calcium Carbonate-Vitamin D 600-200 MG-UNIT Tab Take  by mouth.       No current facility-administered medications for this visit.     Past Medical History:   Diagnosis Date    CVA (cerebral vascular accident) ([facility])     Dysuria 2022    Neuropathic peripheral nerve     OSTEOPOROSIS     Right knee injury     meniscus damage    S/P BKA (below knee amputation) ([facility])     Yeast vaginitis 2022     Past Surgical History:   Procedure Laterality Date    ABDOMINAL HYSTERECTOMY TOTAL      bilateral salpingo-oophorectomy in 89 and 89    HIP ARTHROPLASTY MIS TOTAL      KNEE AMPUTATION BELOW Bilateral      Social History     Tobacco Use    Smoking status: Former     Types: Cigarettes     Quit date: 1988     Years since quittin.6    Smokeless tobacco: Never   Vaping Use    Vaping Use: Never used   Substance Use Topics    Alcohol use: No     Social History     Social History Narrative    Not on file       Family History   Problem Relation Age of Onset    Heart Failure Mother     Heart Disease Father         ROS    see hpi      - NOTE: All other systems reviewed and are negative, except as in HPI.      Exam:    /66 (BP Location: Left arm, Patient Position: Sitting, BP  "Cuff Size: Adult)   Pulse 71   Temp 36.9 °C (98.5 °F) (Temporal)   Resp 12   Ht 1.702 m (5' 7\")   Wt 72.6 kg (160 lb)   SpO2 96%  Body mass index is 25.06 kg/m².    General: Alert, pleasant, NAD  EYES:   PERRL, EOMI, no icterus or pallor.  Conjunctivae and lids normal.   HENT:  Normocephalic.  External ears normal.   Abdomen: Non-distended, soft, non-tender, no guarding/rebound. Bowel sounds present.  No hepatosplenomegaly.  No hernias.  Skin: Warm, dry, no rashes.  Musculoskeletal: WCB.  Right sided hemiplegia    Extremities: bilateral LE ambutations  Psych:  Affect/mood is normal, judgement is good, memory is intact, grooming is appropriate.      Assessment/Plan:  1. Functional urinary incontinence  - Referral to Urology  - fosfomycin (MONUROL) 3 GM Pack; Take 3 g by mouth one time for 1 dose.  Dispense: 1 Each; Refill: 0  2. Dysuria  - Referral to Urology  - fosfomycin (MONUROL) 3 GM Pack; Take 3 g by mouth one time for 1 dose.  Dispense: 1 Each; Refill: 0  3. Recurrent UTI  - Referral to Urology  - fosfomycin (MONUROL) 3 GM Pack; Take 3 g by mouth one time for 1 dose.  Dispense: 1 Each; Refill: 0      Patient was recently prescribed Macrobid.  Patient does live in a long-term care facility and has allergies to cefdinir, penicillins, sulfas.  We will start her on 3 g of fosfomycin once and Reevaluate next week    4. History of CVA (cerebrovascular accident)  - Referral to Urology    5. Candida infection of genital region  Continue to monitor       Discussed with patient possible alternative diagnoses, patient is to take all medications as prescribed.     If symptoms persist FU w/PCP, if symptoms worsen go to emergency room.     If experiencing any side effects from prescribed medications reports to the office immediately or go to emergency room.    Reviewed indication, dosage, usage and potential adverse effects of prescribed medications.     Reviewed risks and benefits of treatment plan. Patient verbalizes " understanding of all instruction and verbally agrees to plan.    Return in about 1 week (around 8/16/2022) for follow up UTI.     Please note that this dictation was created using voice recognition software. I have made every reasonable attempt to correct obvious errors, but I expect that there are errors of grammar and possibly content that I did not discover before finalizing the note.

## 2022-08-12 NOTE — TELEPHONE ENCOUNTER
----- Message from CARMELO Herbert sent at 8/11/2022  6:33 PM PDT -----  Regarding: appt needed for follow up  Needs follow up after antibiotics please

## 2022-08-16 ENCOUNTER — TELEPHONE (OUTPATIENT)
Dept: MEDICAL GROUP | Facility: PHYSICIAN GROUP | Age: 82
End: 2022-08-16
Payer: MEDICARE

## 2022-08-16 NOTE — TELEPHONE ENCOUNTER
Patient called stating they were Covid positive, seeking advise for care as patient states the lodge is not able to care for patient while Covid positive.

## 2022-08-18 ENCOUNTER — PATIENT MESSAGE (OUTPATIENT)
Dept: MEDICAL GROUP | Facility: PHYSICIAN GROUP | Age: 82
End: 2022-08-18

## 2022-08-18 ENCOUNTER — APPOINTMENT (OUTPATIENT)
Dept: MEDICAL GROUP | Facility: PHYSICIAN GROUP | Age: 82
End: 2022-08-18
Payer: MEDICARE

## 2022-08-18 ENCOUNTER — TELEPHONE (OUTPATIENT)
Dept: MEDICAL GROUP | Facility: PHYSICIAN GROUP | Age: 82
End: 2022-08-18

## 2022-08-18 DIAGNOSIS — U07.1 COVID-19: ICD-10-CM

## 2022-08-18 NOTE — PROGRESS NOTES
ISREAL has attempted to call pt many times but number continues to go to . I myself have called as well with same issues. Called over to the [facility] where patient lives, they will send someone to her room to check on her and possibly help her with her phone so we can contact her.

## 2022-08-18 NOTE — PROGRESS NOTES
PC placed to patient as she is unable to come into the office due to her assisted living not being able to transfer her as she is COVID-positive and unable to do a virtual visit as she has no ability to do this.      Onset 1 week ago with cough but States she has been having fevers, sore throat, congestion, difficulty sleeping, body aches for 2 days. Was tested yesterday at her group home for COVID and tested positive.      Denies CP, SOB.     We will start her on Paxlovid that will be picked up by her grandson or friend in [place] we will reevaluate with a phone call tomorrow between 8 and 820.  She was educated on if her oxygen saturation is less than 88% to meet with go to the hospital or if she worsens over the next 24 hours before we are able to speak again

## 2022-08-18 NOTE — TELEPHONE ENCOUNTER
Called pt regarding virtual appointment. Pt still not answering and unable to leave voice mail due to voice mail being full. Per PCP due to unable to communicate with Pt appointment has been cancelled. PCP will call her back to assess the problem.

## 2022-08-19 ENCOUNTER — TELEPHONE (OUTPATIENT)
Dept: MEDICAL GROUP | Facility: PHYSICIAN GROUP | Age: 82
End: 2022-08-19
Payer: MEDICARE

## 2022-08-19 NOTE — TELEPHONE ENCOUNTER
Telephone call with patient today to review how she was feeling since yesterday.  She states she was able to  the prescription and was took her first dose about 10:00 last night and has taken a second dose this morning.  She denies feeling any worse however does not notice really any improvement.  She notes that at 1 point yesterday they thought her oxygen saturation went down to 86% and they called EMS.  EMS arrived and assessed patient and noted that her oxygen saturation went back up to 96%.  Patient refused any transfer of care and continues to monitor.    Discussed with patient over the weekend she can call the on-call provider if she has any questions or concerns as well as return to the emergency room for any worsening conditions.    We will follow-up with patient on Monday morning.

## 2022-08-24 ENCOUNTER — TELEPHONE (OUTPATIENT)
Dept: VASCULAR LAB | Facility: MEDICAL CENTER | Age: 82
End: 2022-08-24
Payer: MEDICARE

## 2022-08-24 NOTE — TELEPHONE ENCOUNTER
Received VM from pt asking to r/s her pharmacotherapy appt to the same day just at a later time    LM to discuss    Tricia Raymundo, DavidD

## 2022-08-26 DIAGNOSIS — E78.5 HYPERLIPIDEMIA DUE TO TYPE 2 DIABETES MELLITUS (HCC): ICD-10-CM

## 2022-08-26 DIAGNOSIS — E11.69 HYPERLIPIDEMIA DUE TO TYPE 2 DIABETES MELLITUS (HCC): ICD-10-CM

## 2022-08-29 ENCOUNTER — ANTICOAGULATION MONITORING (OUTPATIENT)
Dept: MEDICAL GROUP | Facility: MEDICAL CENTER | Age: 82
End: 2022-08-29
Payer: MEDICARE

## 2022-08-30 ENCOUNTER — TELEPHONE (OUTPATIENT)
Dept: VASCULAR LAB | Facility: MEDICAL CENTER | Age: 82
End: 2022-08-30
Payer: MEDICARE

## 2022-08-30 NOTE — TELEPHONE ENCOUNTER
----- Message from Alfonso Bustos, Med Ass't sent at 8/29/2022  3:41 PM PDT -----  Regarding: aMry Patient  Kennedy,    This patient called today very concerned stating she has been calling for Mary, texting her, and does not understand why she is not responding to her messages. She asked to speak with a pharmacist about taking on her care going forward and if that is going to mess with anything. Would one of you mind calling her back to discuss Mary's new position and our continued care for her in our clinic?

## 2022-08-31 RX ORDER — ATORVASTATIN CALCIUM 10 MG/1
TABLET, FILM COATED ORAL
Qty: 180 TABLET | Refills: 0 | Status: SHIPPED | OUTPATIENT
Start: 2022-08-31

## 2022-09-01 ENCOUNTER — ANTICOAGULATION MONITORING (OUTPATIENT)
Dept: MEDICAL GROUP | Facility: PHYSICIAN GROUP | Age: 82
End: 2022-09-01

## 2022-09-01 ENCOUNTER — NON-PROVIDER VISIT (OUTPATIENT)
Dept: MEDICAL GROUP | Facility: PHYSICIAN GROUP | Age: 82
End: 2022-09-01
Payer: MEDICARE

## 2022-09-01 DIAGNOSIS — E11.69 TYPE 2 DIABETES MELLITUS WITH OTHER SPECIFIED COMPLICATION, WITHOUT LONG-TERM CURRENT USE OF INSULIN (HCC): ICD-10-CM

## 2022-09-01 LAB — GLUCOSE BLD-MCNC: 175 MG/DL (ref 70–100)

## 2022-09-01 PROCEDURE — 82962 GLUCOSE BLOOD TEST: CPT | Performed by: STUDENT IN AN ORGANIZED HEALTH CARE EDUCATION/TRAINING PROGRAM

## 2022-09-01 PROCEDURE — 99211 OFF/OP EST MAY X REQ PHY/QHP: CPT | Performed by: STUDENT IN AN ORGANIZED HEALTH CARE EDUCATION/TRAINING PROGRAM

## 2022-09-01 PROCEDURE — 99999 POCT GLUCOSE: CPT | Performed by: STUDENT IN AN ORGANIZED HEALTH CARE EDUCATION/TRAINING PROGRAM

## 2022-09-01 NOTE — PROGRESS NOTES
Jardiance samples provided (10mg) is all that was available this months.    Mary Cisneros, Clinical Pharmacist, CDE, CACP  Managed Care Pharmacist for [facility]

## 2022-09-01 NOTE — PROGRESS NOTES
Patient Consult Note    TIME IN: 1:30  TIME OUT: 2:00    Primary care physician: CARMELO Herbert    Reason for consult: Management of Controlled Type 2 Diabetes    HPI:  Shireen Astorga is a 82 y.o. old patient who comes in today for evaluation of above stated problem.    Most Recent HbA1c and POCT glucose:   Lab Results   Component Value Date/Time    HBA1C 6.8 (A) 08/04/2022 09:50 AM      Recent Labs     09/01/22  1341   POCGLUCOSE 175*       Most Recent Scr:  Lab Results   Component Value Date/Time    CREATININE 0.69 01/04/2022 11:12 AM        Current Diabetes Medication Regimen    SGLT-2 Inhibitor: Jardiance 25mg        Previous Diabetes Medications and Reason for Discontinuation  N/a    Pt has home glucometer and proper testing technique - n/a    Pt reports blood sugars:       Hypoglycemia awareness - yes  Nocturnal hypoglycemia- none  Hypoglycemia:  None    Pt's treatment of Hypoglycemia - n/a    - 15:15 Rule    Current Exercise - none pt is wheelchair bound  Exercise Goal - pt would benefit from daily dedicated walking      Foot Exam:  Monofilament exam -    Monofilament testing with a 10 gram force: sensation intact: decreased bilaterally.    Visual Inspection: Feet without maceration, ulcers, fissures.  Feet dry.  Pedal pulses: intact bilaterally    Preventative Management  BP regimen (ACE/ARB) - none  ASA - 81mg  Statin - atorva 10  Last Retinal Scan -     Last Foot Exam -    Last A1c -   Lab Results   Component Value Date/Time    HBA1C 6.8 (A) 08/04/2022 09:50 AM      Last Microalbuminuria -       updated caregaps    Past Medical History:  Patient Active Problem List    Diagnosis Date Noted    Candida infection of genital region 08/04/2022    Recurrent UTI 07/26/2022    Contracture of hand 04/26/2022    Vertigo 04/26/2022    Fingertip amputation 02/16/2022    Type 2 diabetes mellitus with other specified complication (HCC) 01/25/2022    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)  2022    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 ([facility]) 2022    Gastroesophageal reflux disease 2022    Functional urinary incontinence 2022    BMI 25.0-25.9,adult 2021    Phantom pain ([facility]) 2021    Hyperlipidemia due to type 2 diabetes mellitus ([facility]) 2021    Hemiplegia as late effect of cerebrovascular accident (CVA) ([facility]) 2021    Diabetic retinopathy of both eyes associated with type 2 diabetes mellitus ([facility]) 2021    Age-related osteoporosis without current pathological fracture 10/25/2012       Past Surgical History:  Past Surgical History:   Procedure Laterality Date    ABDOMINAL HYSTERECTOMY TOTAL      bilateral salpingo-oophorectomy in 89 and 89    HIP ARTHROPLASTY MIS TOTAL      KNEE AMPUTATION BELOW Bilateral        Allergies:  Cefdinir, Codeine, Lactose, Pcn [penicillins], Sulfa drugs, Azithromycin, Gluten meal, and Piperacillin sod-tazobactam so    Social History:  Social History     Socioeconomic History    Marital status: Unknown     Spouse name: Not on file    Number of children: Not on file    Years of education: Not on file    Highest education level: Not on file   Occupational History    Not on file   Tobacco Use    Smoking status: Former     Types: Cigarettes     Quit date: 1988     Years since quittin.6    Smokeless tobacco: Never   Vaping Use    Vaping Use: Never used   Substance and Sexual Activity    Alcohol use: No    Drug use: Not on file    Sexual activity: Not on file   Other Topics Concern    Not on file   Social History Narrative    Not on file     Social Determinants of Health     Financial Resource Strain: Not on file   Food Insecurity: Not on file   Transportation Needs: Not on file   Physical Activity: Not on file   Stress: Not on file   Social Connections: Not on file   Intimate Partner Violence: Not on file   Housing Stability: Not on file       Family History:  Family History   Problem Relation Age of Onset    Heart  Failure Mother     Heart Disease Father        Medications:    Current Outpatient Medications:     atorvastatin (LIPITOR) 10 MG Tab, Take 1 tablet by mouth once daily, Disp: 180 Tablet, Rfl: 0    Nirmatrelvir&Ritonavir 300/100 20 x 150 MG & 10 x 100MG Tablet Therapy Pack, Take three tablets twice daily for 5 days- Dispense per pharmacy instructions, Disp: 1 Each, Rfl: 0    nystatin (MYCOSTATIN) 521247 UNIT/GM Cream topical cream, Apply 1 g topically 2 times a day., Disp: 15 g, Rfl: 0    chlorhexidine (PERIDEX) 0.12 % Solution, RINSE WITH 15 MLS BY MOUTH FOR 30 SECONDS TWICE DAILY FOR ONE WEEK EACH MONTH AVOID FOOD AND DRINK FOR 30 MINUTES AFTER USE, Disp: , Rfl:     gabapentin (NEURONTIN) 600 MG tablet, Take 1 Tablet by mouth 2 times a day., Disp: 180 Tablet, Rfl: 0    fluconazole (DIFLUCAN) 150 MG tablet, Take one tablet today, repeat in 3 days if not resolved., Disp: 2 Tablet, Rfl: 1    denosumab (PROLIA) 60 MG/ML Solution Prefilled Syringe injection, Inject 1 mL under the skin every 6 months., Disp: 1 mL, Rfl: 0    nystatin (MYCOSTATIN) powder, Apply 1 g topically 2 times a day., Disp: 15 g, Rfl: 0    miconazole (MONISTAT-7) 100 MG Suppos, Insert 1 Suppository into the vagina every evening., Disp: 7 Suppository, Rfl: 0    brimonidine (ALPHAGAN) 0.2 % Solution, INSTILL 1 DROP INTO BOTH EYES TWICE A DAY, Disp: , Rfl:     Empagliflozin (JARDIANCE) 25 MG Tab, Take 1 Tablet by mouth every day., Disp: , Rfl:     vitamin D (VITAMIND D3) 1000 UNIT Tab, Take 1,000 Units by mouth every day., Disp: , Rfl:     omeprazole (PRILOSEC) 20 MG delayed-release capsule, Take 20 mg by mouth every day., Disp: , Rfl:     acetaminophen (TYLENOL) 500 MG Tab, , Disp: , Rfl:     aspirin (ASA) 81 MG Chew Tab chewable tablet, 2 tab(s) orally once a day, Disp: , Rfl:     Biotin 10 MG Tab, Take 1 Tablet by mouth every day., Disp: , Rfl:     Coenzyme Q10 200 MG Cap, Take 1 Capsule by mouth every day., Disp: , Rfl:     meclizine (ANTIVERT) 25 MG  Tab, Take 25 mg by mouth., Disp: , Rfl:     Cyanocobalamin (B-12) 500 MCG Tab, Take  by mouth., Disp: , Rfl:     Calcium Carbonate-Vitamin D 600-200 MG-UNIT Tab, Take  by mouth., Disp: , Rfl:     Labs: Reviewed    Physical Examination:  Vital signs: LMP 10/28/1989  There is no height or weight on file to calculate BMI.    Assessment and Plan:    1. DM2  Basic physiology of DMII was explained to patient as well as microvascular/macrovascular complications. The importance of increasing physical activity to improve diabetes control was discussed with the patient. Patient was also educated on changing diet and making better choices to help control blood sugar.    Discussed Goals: FBG 80 - 130, 2hPP < 180, a1c < 7.0%    Pt presents for samples, however we are out of the Jardiance 25mg, will provide Jardiance 10mg samples  FSBG 175 in the office, pt just had lunch  Continue present management    - Medication changes:  none     - Lifestyle changes:  none    Follow Up:  4 weeks    Mary Cisneros    CC:   CARMELO Herbert

## 2022-09-28 ENCOUNTER — ANTICOAGULATION MONITORING (OUTPATIENT)
Dept: MEDICAL GROUP | Facility: PHYSICIAN GROUP | Age: 82
End: 2022-09-28

## 2022-09-28 ENCOUNTER — NON-PROVIDER VISIT (OUTPATIENT)
Dept: MEDICAL GROUP | Facility: PHYSICIAN GROUP | Age: 82
End: 2022-09-28
Payer: MEDICARE

## 2022-09-28 DIAGNOSIS — E11.319 DIABETIC RETINOPATHY OF BOTH EYES ASSOCIATED WITH TYPE 2 DIABETES MELLITUS, MACULAR EDEMA PRESENCE UNSPECIFIED, UNSPECIFIED RETINOPATHY SEVERITY (HCC): ICD-10-CM

## 2022-09-28 LAB — GLUCOSE BLD-MCNC: 110 MG/DL (ref 70–100)

## 2022-09-28 PROCEDURE — 99999 POCT GLUCOSE: CPT | Performed by: STUDENT IN AN ORGANIZED HEALTH CARE EDUCATION/TRAINING PROGRAM

## 2022-09-28 PROCEDURE — 99211 OFF/OP EST MAY X REQ PHY/QHP: CPT | Performed by: STUDENT IN AN ORGANIZED HEALTH CARE EDUCATION/TRAINING PROGRAM

## 2022-09-28 PROCEDURE — 82962 GLUCOSE BLOOD TEST: CPT | Performed by: STUDENT IN AN ORGANIZED HEALTH CARE EDUCATION/TRAINING PROGRAM

## 2022-09-28 NOTE — PROGRESS NOTES
Patient Consult Note    TIME IN: 2:00  TIME OUT: 2:30    Primary care physician: CARMELO Herbert    Reason for consult: Management of Uncontrolled Type 2 Diabetes    HPI:  Shireen Astorga is a 82 y.o. old patient who comes in today for evaluation of above stated problem.    11:30 am 110 14 almonds v8 juice    Diet cranberry juice pork rinds  Ate pear and apple yesterday- no diarrhea   Eating- breakfast: eggs, bolaños , hash browns fruit v8 ice water     Drinking cranberry juice everyday    Most Recent HbA1c and POCT glucose:   Lab Results   Component Value Date/Time    HBA1C 6.8 (A) 08/04/2022 09:50 AM      Recent Labs     09/28/22  0230   POCGLUCOSE 110*       Most Recent Scr:  Lab Results   Component Value Date/Time    CREATININE 0.69 01/04/2022 11:12 AM        Current Diabetes Medication Regimen    SGLT-2 Inhibitor: Jardiance 25mg po daily       Previous Diabetes Medications and Reason for Discontinuation       Pt has home glucometer and proper testing technique - unable to do as she is a left michelle      Hypoglycemia awareness - yes  Nocturnal hypoglycemia- none  Hypoglycemia:  None    Pt's treatment of Hypoglycemia - n/a  - 15:15 Rule    Current Exercise - pt is wheelchair bound      Foot Exam:  Monofilament exam -    Monofilament testing with a 10 gram force: sensation intact: decreased bilaterally.    Visual Inspection: Feet without maceration, ulcers, fissures.  Feet dry.  Pedal pulses: intact bilaterally    Preventative Management  BP regimen (ACE/ARB) - none  ASA - 81mg po daily  Statin - atorva 10  Last Retinal Scan -    Last Foot Exam -    Last A1c -   Lab Results   Component Value Date/Time    HBA1C 6.8 (A) 08/04/2022 09:50 AM      Last Microalbuminuria -       updated caregaps    Past Medical History:  Patient Active Problem List    Diagnosis Date Noted    Candida infection of genital region 08/04/2022    Recurrent UTI 07/26/2022    Contracture of hand 04/26/2022    Vertigo 04/26/2022     Fingertip amputation 2022    Type 2 diabetes mellitus with other specified complication ([facility]) 2022    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 ([facility]) 2022    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 ([facility]) 2022    Gastroesophageal reflux disease 2022    Functional urinary incontinence 2022    BMI 25.0-25.9,adult 2021    Phantom pain ([facility]) 2021    Hyperlipidemia due to type 2 diabetes mellitus ([facility]) 2021    Hemiplegia as late effect of cerebrovascular accident (CVA) ([facility]) 2021    Diabetic retinopathy of both eyes associated with type 2 diabetes mellitus ([facility]) 2021    Age-related osteoporosis without current pathological fracture 10/25/2012       Past Surgical History:  Past Surgical History:   Procedure Laterality Date    ABDOMINAL HYSTERECTOMY TOTAL      bilateral salpingo-oophorectomy in 89 and 89    HIP ARTHROPLASTY MIS TOTAL      KNEE AMPUTATION BELOW Bilateral        Allergies:  Cefdinir, Codeine, Lactose, Pcn [penicillins], Sulfa drugs, Azithromycin, Gluten meal, and Piperacillin sod-tazobactam so    Social History:  Social History     Socioeconomic History    Marital status: Unknown     Spouse name: Not on file    Number of children: Not on file    Years of education: Not on file    Highest education level: Not on file   Occupational History    Not on file   Tobacco Use    Smoking status: Former     Types: Cigarettes     Quit date: 1988     Years since quittin.7    Smokeless tobacco: Never   Vaping Use    Vaping Use: Never used   Substance and Sexual Activity    Alcohol use: No    Drug use: Not on file    Sexual activity: Not on file   Other Topics Concern    Not on file   Social History Narrative    Not on file     Social Determinants of Health     Financial Resource Strain: Not on file   Food Insecurity: Not on file   Transportation Needs: Not on file   Physical Activity: Not on file   Stress: Not on file   Social  Connections: Not on file   Intimate Partner Violence: Not on file   Housing Stability: Not on file       Family History:  Family History   Problem Relation Age of Onset    Heart Failure Mother     Heart Disease Father        Medications:    Current Outpatient Medications:     atorvastatin (LIPITOR) 10 MG Tab, Take 1 tablet by mouth once daily, Disp: 180 Tablet, Rfl: 0    Nirmatrelvir&Ritonavir 300/100 20 x 150 MG & 10 x 100MG Tablet Therapy Pack, Take three tablets twice daily for 5 days- Dispense per pharmacy instructions, Disp: 1 Each, Rfl: 0    nystatin (MYCOSTATIN) 927859 UNIT/GM Cream topical cream, Apply 1 g topically 2 times a day., Disp: 15 g, Rfl: 0    chlorhexidine (PERIDEX) 0.12 % Solution, RINSE WITH 15 MLS BY MOUTH FOR 30 SECONDS TWICE DAILY FOR ONE WEEK EACH MONTH AVOID FOOD AND DRINK FOR 30 MINUTES AFTER USE, Disp: , Rfl:     gabapentin (NEURONTIN) 600 MG tablet, Take 1 Tablet by mouth 2 times a day., Disp: 180 Tablet, Rfl: 0    fluconazole (DIFLUCAN) 150 MG tablet, Take one tablet today, repeat in 3 days if not resolved., Disp: 2 Tablet, Rfl: 1    denosumab (PROLIA) 60 MG/ML Solution Prefilled Syringe injection, Inject 1 mL under the skin every 6 months., Disp: 1 mL, Rfl: 0    nystatin (MYCOSTATIN) powder, Apply 1 g topically 2 times a day., Disp: 15 g, Rfl: 0    miconazole (MONISTAT-7) 100 MG Suppos, Insert 1 Suppository into the vagina every evening., Disp: 7 Suppository, Rfl: 0    brimonidine (ALPHAGAN) 0.2 % Solution, INSTILL 1 DROP INTO BOTH EYES TWICE A DAY, Disp: , Rfl:     Empagliflozin (JARDIANCE) 25 MG Tab, Take 1 Tablet by mouth every day., Disp: , Rfl:     vitamin D (VITAMIND D3) 1000 UNIT Tab, Take 1,000 Units by mouth every day., Disp: , Rfl:     omeprazole (PRILOSEC) 20 MG delayed-release capsule, Take 20 mg by mouth every day., Disp: , Rfl:     acetaminophen (TYLENOL) 500 MG Tab, , Disp: , Rfl:     aspirin (ASA) 81 MG Chew Tab chewable tablet, 2 tab(s) orally once a day, Disp: , Rfl:      Biotin 10 MG Tab, Take 1 Tablet by mouth every day., Disp: , Rfl:     Coenzyme Q10 200 MG Cap, Take 1 Capsule by mouth every day., Disp: , Rfl:     meclizine (ANTIVERT) 25 MG Tab, Take 25 mg by mouth., Disp: , Rfl:     Cyanocobalamin (B-12) 500 MCG Tab, Take  by mouth., Disp: , Rfl:     Calcium Carbonate-Vitamin D 600-200 MG-UNIT Tab, Take  by mouth., Disp: , Rfl:     Labs: Reviewed    Physical Examination:  Vital signs: LMP 10/28/1989  There is no height or weight on file to calculate BMI.    Assessment and Plan:    1. DM2  Basic physiology of DMII was explained to patient as well as microvascular/macrovascular complications. The importance of increasing physical activity to improve diabetes control was discussed with the patient. Patient was also educated on changing diet and making better choices to help control blood sugar.    Discussed Goals: FBG 80 - 130, 2hPP < 180, a1c < 7.0%    Pt c/o yeast infection/UTI x3 months,  Will trial another 14 days of jardiance, if yeast infection returns, will STOP jardiance  FSBG 110 in the office        - Medication changes:  none     - Lifestyle changes:  Continue to reduce simple carbohydrate consumption, however she is limited  [place] [facility].    Follow Up:  4 weeks    Mary Cisneros    CC:   CARMELO Herbert

## 2022-09-28 NOTE — PROGRESS NOTES
Jardiance samples provided  Mary Cisneros, Clinical Pharmacist, CDE, CACP  Managed Care Pharmacist for [facility] and [facility]

## 2022-10-13 ENCOUNTER — TELEPHONE (OUTPATIENT)
Dept: MEDICAL GROUP | Facility: PHYSICIAN GROUP | Age: 82
End: 2022-10-13
Payer: MEDICARE

## 2022-10-14 NOTE — TELEPHONE ENCOUNTER
Patient states she will not be able be seen as she is in the [facility] facility for an unknown amount of time and would rather focus on the care in that location for the time being.

## 2022-10-18 ENCOUNTER — TELEPHONE (OUTPATIENT)
Dept: VASCULAR LAB | Facility: MEDICAL CENTER | Age: 82
End: 2022-10-18
Payer: MEDICARE

## 2022-10-18 NOTE — TELEPHONE ENCOUNTER
Pt left message to update clinic she is in rehab and cannot make appt tomorrow    LM w/ pt asking to return my call to reschedule    Tricia Raymundo, PharmD

## 2022-10-31 ENCOUNTER — HOME HEALTH ADMISSION (OUTPATIENT)
Dept: HOME HEALTH SERVICES | Facility: HOME HEALTHCARE | Age: 82
End: 2022-10-31
Payer: MEDICARE

## 2022-11-03 ENCOUNTER — DOCUMENTATION (OUTPATIENT)
Dept: HEALTH INFORMATION MANAGEMENT | Facility: OTHER | Age: 82
End: 2022-11-03

## 2022-11-09 ENCOUNTER — PATIENT MESSAGE (OUTPATIENT)
Dept: HEALTH INFORMATION MANAGEMENT | Facility: OTHER | Age: 82
End: 2022-11-09

## 2022-11-09 ENCOUNTER — PATIENT OUTREACH (OUTPATIENT)
Dept: HEALTH INFORMATION MANAGEMENT | Facility: OTHER | Age: 82
End: 2022-11-09

## 2022-11-15 ENCOUNTER — PATIENT OUTREACH (OUTPATIENT)
Dept: HEALTH INFORMATION MANAGEMENT | Facility: OTHER | Age: 82
End: 2022-11-15

## 2022-11-15 NOTE — PROGRESS NOTES
CHW called pt to discuss CCM services. Pt did not answer. CHW left a vm with the contact information to call back.

## 2022-11-17 ENCOUNTER — PATIENT OUTREACH (OUTPATIENT)
Dept: HEALTH INFORMATION MANAGEMENT | Facility: OTHER | Age: 82
End: 2022-11-17